# Patient Record
Sex: MALE | Race: BLACK OR AFRICAN AMERICAN | Employment: UNEMPLOYED | URBAN - METROPOLITAN AREA
[De-identification: names, ages, dates, MRNs, and addresses within clinical notes are randomized per-mention and may not be internally consistent; named-entity substitution may affect disease eponyms.]

---

## 2024-02-07 ENCOUNTER — HOSPITAL ENCOUNTER (EMERGENCY)
Age: 44
Discharge: HOME | End: 2024-02-07

## 2024-02-07 ENCOUNTER — APPOINTMENT (OUTPATIENT)
Dept: GENERAL RADIOLOGY | Age: 44
End: 2024-02-07

## 2024-02-07 VITALS
DIASTOLIC BLOOD PRESSURE: 84 MMHG | TEMPERATURE: 99 F | RESPIRATION RATE: 14 BRPM | BODY MASS INDEX: 21.69 KG/M2 | SYSTOLIC BLOOD PRESSURE: 123 MMHG | HEIGHT: 66 IN | WEIGHT: 135 LBS | HEART RATE: 87 BPM | OXYGEN SATURATION: 98 %

## 2024-02-07 DIAGNOSIS — Z72.0 TOBACCO USE: ICD-10-CM

## 2024-02-07 DIAGNOSIS — J20.9 ACUTE BRONCHITIS, UNSPECIFIED ORGANISM: Primary | ICD-10-CM

## 2024-02-07 LAB
FLUAV RNA RESP QL NAA+PROBE: NOT DETECTED
FLUBV RNA RESP QL NAA+PROBE: NOT DETECTED
SARS-COV-2 RNA RESP QL NAA+PROBE: NOT DETECTED

## 2024-02-07 PROCEDURE — 99284 EMERGENCY DEPT VISIT MOD MDM: CPT

## 2024-02-07 PROCEDURE — 71046 X-RAY EXAM CHEST 2 VIEWS: CPT

## 2024-02-07 PROCEDURE — 87636 SARSCOV2 & INF A&B AMP PRB: CPT

## 2024-02-07 RX ORDER — BENZONATATE 100 MG/1
100 CAPSULE ORAL 3 TIMES DAILY PRN
Qty: 30 CAPSULE | Refills: 0 | Status: SHIPPED | OUTPATIENT
Start: 2024-02-07 | End: 2024-02-14

## 2024-02-07 RX ORDER — IBUPROFEN 600 MG/1
600 TABLET ORAL EVERY 6 HOURS PRN
Qty: 20 TABLET | Refills: 0 | Status: SHIPPED | OUTPATIENT
Start: 2024-02-07

## 2024-02-07 RX ORDER — ALBUTEROL SULFATE 90 UG/1
2 AEROSOL, METERED RESPIRATORY (INHALATION) 4 TIMES DAILY PRN
Qty: 54 G | Refills: 1 | Status: SHIPPED | OUTPATIENT
Start: 2024-02-07

## 2024-02-07 ASSESSMENT — PAIN SCALES - GENERAL: PAINLEVEL_OUTOF10: 1

## 2024-02-07 ASSESSMENT — ENCOUNTER SYMPTOMS
SHORTNESS OF BREATH: 0
VOICE CHANGE: 0
VOMITING: 0
BACK PAIN: 0
TROUBLE SWALLOWING: 0
RECTAL PAIN: 0
COUGH: 1
SORE THROAT: 1
STRIDOR: 0
COLOR CHANGE: 0
DIARRHEA: 0
NAUSEA: 0
CONSTIPATION: 0
ABDOMINAL PAIN: 0
WHEEZING: 0

## 2024-02-07 ASSESSMENT — PAIN - FUNCTIONAL ASSESSMENT: PAIN_FUNCTIONAL_ASSESSMENT: 0-10

## 2024-02-07 NOTE — ED PROVIDER NOTES
[facility] EMERGENCY DEPARTMENT  EMERGENCY DEPARTMENT ENCOUNTER        Pt Name: Brian Zamora  MRN: [id number]  Birthdate 1980  Date of evaluation: 2/7/2024  Provider: Radha Hdez PA-C  PCP: No primary care provider on file.  Note Started: 4:29 PM EST 2/7/24      ARJUN. I have evaluated this patient.        CHIEF COMPLAINT       Chief Complaint   Patient presents with    Cough     Cough for months       HISTORY OF PRESENT ILLNESS: 1 or more Elements     History from : Patient    Limitations to history : None    Brian Zamora is a 43 y.o. male who presents to the emergency department with complaints of cough for a little over 1 month.  He smokes cigarettes daily.  He denies productive cough, hemoptysis, palpitations, chest pain, shortness of breath, pain or swelling in extremities, unexplained weight gain or weight loss.  He reports some congestion and sore throat for the past few days.  He denies documented fever or chills.    Nursing Notes were all reviewed and agreed with or any disagreements were addressed in the HPI.    REVIEW OF SYSTEMS :      Review of Systems   Constitutional:  Negative for chills and fever.   HENT:  Positive for congestion and sore throat. Negative for dental problem, drooling, ear discharge, ear pain, tinnitus, trouble swallowing and voice change.    Eyes:  Negative for visual disturbance.   Respiratory:  Positive for cough. Negative for shortness of breath, wheezing and stridor.    Cardiovascular:  Negative for chest pain, palpitations and leg swelling.   Gastrointestinal:  Negative for abdominal pain, constipation, diarrhea, nausea, rectal pain and vomiting.   Genitourinary: Negative.    Musculoskeletal:  Negative for back pain, neck pain and neck stiffness.   Skin:  Negative for color change, pallor, rash and wound.   Neurological: Negative.    Psychiatric/Behavioral:  Negative for confusion.    All other systems reviewed and are

## 2024-02-18 ENCOUNTER — APPOINTMENT (OUTPATIENT)
Dept: GENERAL RADIOLOGY | Age: 44
End: 2024-02-18

## 2024-02-18 ENCOUNTER — HOSPITAL ENCOUNTER (EMERGENCY)
Age: 44
Discharge: HOME | End: 2024-02-18

## 2024-02-18 VITALS
BODY MASS INDEX: 26.87 KG/M2 | WEIGHT: 166.5 LBS | SYSTOLIC BLOOD PRESSURE: 119 MMHG | OXYGEN SATURATION: 99 % | RESPIRATION RATE: 16 BRPM | TEMPERATURE: 98.5 F | DIASTOLIC BLOOD PRESSURE: 77 MMHG | HEART RATE: 70 BPM

## 2024-02-18 DIAGNOSIS — J10.1 INFLUENZA A: ICD-10-CM

## 2024-02-18 DIAGNOSIS — J06.9 ACUTE UPPER RESPIRATORY INFECTION: Primary | ICD-10-CM

## 2024-02-18 LAB
ALBUMIN SERPL-MCNC: 4.8 G/DL (ref 3.4–5)
ALBUMIN/GLOB SERPL: 1.4 {RATIO} (ref 1.1–2.2)
ALP SERPL-CCNC: 67 U/L (ref 40–129)
ALT SERPL-CCNC: 34 U/L (ref 10–40)
ANION GAP SERPL CALCULATED.3IONS-SCNC: 11 MMOL/L (ref 3–16)
AST SERPL-CCNC: 31 U/L (ref 15–37)
BASOPHILS # BLD: 0.1 K/UL (ref 0–0.2)
BASOPHILS NFR BLD: 0.8 %
BILIRUB SERPL-MCNC: 0.4 MG/DL (ref 0–1)
BUN SERPL-MCNC: 8 MG/DL (ref 7–20)
CALCIUM SERPL-MCNC: 9.7 MG/DL (ref 8.3–10.6)
CHLORIDE SERPL-SCNC: 103 MMOL/L (ref 99–110)
CO2 SERPL-SCNC: 26 MMOL/L (ref 21–32)
CREAT SERPL-MCNC: 0.9 MG/DL (ref 0.9–1.3)
DEPRECATED RDW RBC AUTO: 15.7 % (ref 12.4–15.4)
EOSINOPHIL # BLD: 0.5 K/UL (ref 0–0.6)
EOSINOPHIL NFR BLD: 5.7 %
FLUAV RNA RESP QL NAA+PROBE: DETECTED
FLUBV RNA RESP QL NAA+PROBE: NOT DETECTED
GFR SERPLBLD CREATININE-BSD FMLA CKD-EPI: >60 ML/MIN/{1.73_M2}
GLUCOSE SERPL-MCNC: 94 MG/DL (ref 70–99)
HCT VFR BLD AUTO: 50.8 % (ref 40.5–52.5)
HGB BLD-MCNC: 16.3 G/DL (ref 13.5–17.5)
LACTATE BLDV-SCNC: 1.8 MMOL/L (ref 0.4–1.9)
LYMPHOCYTES # BLD: 2.3 K/UL (ref 1–5.1)
LYMPHOCYTES NFR BLD: 27.8 %
MCH RBC QN AUTO: 22.9 PG (ref 26–34)
MCHC RBC AUTO-ENTMCNC: 32.1 G/DL (ref 31–36)
MCV RBC AUTO: 71.5 FL (ref 80–100)
MONOCYTES # BLD: 0.7 K/UL (ref 0–1.3)
MONOCYTES NFR BLD: 8.3 %
NEUTROPHILS # BLD: 4.7 K/UL (ref 1.7–7.7)
NEUTROPHILS NFR BLD: 57.4 %
NT-PROBNP SERPL-MCNC: <36 PG/ML (ref 0–124)
PLATELET # BLD AUTO: 203 K/UL (ref 135–450)
PMV BLD AUTO: 8.2 FL (ref 5–10.5)
POTASSIUM SERPL-SCNC: 4.5 MMOL/L (ref 3.5–5.1)
PROCALCITONIN SERPL IA-MCNC: 0.07 NG/ML (ref 0–0.15)
PROT SERPL-MCNC: 8.2 G/DL (ref 6.4–8.2)
RBC # BLD AUTO: 7.1 M/UL (ref 4.2–5.9)
SARS-COV-2 RNA RESP QL NAA+PROBE: NOT DETECTED
SODIUM SERPL-SCNC: 140 MMOL/L (ref 136–145)
TROPONIN, HIGH SENSITIVITY: 20 NG/L (ref 0–22)
WBC # BLD AUTO: 8.3 K/UL (ref 4–11)

## 2024-02-18 PROCEDURE — 93005 ELECTROCARDIOGRAM TRACING: CPT | Performed by: PHYSICIAN ASSISTANT

## 2024-02-18 PROCEDURE — 94640 AIRWAY INHALATION TREATMENT: CPT

## 2024-02-18 PROCEDURE — 80053 COMPREHEN METABOLIC PANEL: CPT

## 2024-02-18 PROCEDURE — 84145 PROCALCITONIN (PCT): CPT

## 2024-02-18 PROCEDURE — 84484 ASSAY OF TROPONIN QUANT: CPT

## 2024-02-18 PROCEDURE — 83880 ASSAY OF NATRIURETIC PEPTIDE: CPT

## 2024-02-18 PROCEDURE — 87636 SARSCOV2 & INF A&B AMP PRB: CPT

## 2024-02-18 PROCEDURE — 6360000002 HC RX W HCPCS: Performed by: PHYSICIAN ASSISTANT

## 2024-02-18 PROCEDURE — 83605 ASSAY OF LACTIC ACID: CPT

## 2024-02-18 PROCEDURE — 85025 COMPLETE CBC W/AUTO DIFF WBC: CPT

## 2024-02-18 PROCEDURE — 6370000000 HC RX 637 (ALT 250 FOR IP): Performed by: PHYSICIAN ASSISTANT

## 2024-02-18 PROCEDURE — 71046 X-RAY EXAM CHEST 2 VIEWS: CPT

## 2024-02-18 PROCEDURE — 99285 EMERGENCY DEPT VISIT HI MDM: CPT

## 2024-02-18 RX ORDER — PREDNISONE 20 MG/1
60 TABLET ORAL ONCE
Status: COMPLETED | OUTPATIENT
Start: 2024-02-18 | End: 2024-02-18

## 2024-02-18 RX ORDER — ALBUTEROL SULFATE 90 UG/1
AEROSOL, METERED RESPIRATORY (INHALATION)
Qty: 18 G | Refills: 0 | Status: SHIPPED | OUTPATIENT
Start: 2024-02-18

## 2024-02-18 RX ORDER — BENZONATATE 100 MG/1
100 CAPSULE ORAL 3 TIMES DAILY PRN
Qty: 21 CAPSULE | Refills: 0 | Status: SHIPPED | OUTPATIENT
Start: 2024-02-18 | End: 2024-02-25

## 2024-02-18 RX ORDER — PREDNISONE 10 MG/1
40 TABLET ORAL DAILY
Qty: 20 TABLET | Refills: 0 | Status: SHIPPED | OUTPATIENT
Start: 2024-02-18 | End: 2024-02-23

## 2024-02-18 RX ORDER — ALBUTEROL SULFATE 2.5 MG/3ML
5 SOLUTION RESPIRATORY (INHALATION) ONCE
Status: COMPLETED | OUTPATIENT
Start: 2024-02-18 | End: 2024-02-18

## 2024-02-18 RX ADMIN — PREDNISONE 60 MG: 20 TABLET ORAL at 16:50

## 2024-02-18 RX ADMIN — ALBUTEROL SULFATE 5 MG: 2.5 SOLUTION RESPIRATORY (INHALATION) at 17:28

## 2024-02-18 ASSESSMENT — PAIN SCALES - GENERAL: PAINLEVEL_OUTOF10: 0

## 2024-02-19 LAB
EKG ATRIAL RATE: 91 BPM
EKG DIAGNOSIS: NORMAL
EKG P AXIS: 82 DEGREES
EKG P-R INTERVAL: 100 MS
EKG Q-T INTERVAL: 322 MS
EKG QRS DURATION: 78 MS
EKG QTC CALCULATION (BAZETT): 396 MS
EKG R AXIS: 2 DEGREES
EKG T AXIS: 61 DEGREES
EKG VENTRICULAR RATE: 91 BPM

## 2024-02-19 PROCEDURE — 93010 ELECTROCARDIOGRAM REPORT: CPT | Performed by: INTERNAL MEDICINE

## 2024-02-19 NOTE — ED PROVIDER NOTES
[facility] EMERGENCY DEPARTMENT  EMERGENCY DEPARTMENT ENCOUNTER        Pt Name: Brian Zamora  MRN: [id number]  Birthdate 1980  Date of evaluation: 2/18/2024  Provider: Krzysztof Diaz PA-C  PCP: No primary care provider on file.  Note Started: 4:51 PM EST 2/18/24      ARJUN. I have evaluated this patient.        CHIEF COMPLAINT       Chief Complaint   Patient presents with    Cough     Pt came to the hospital due to having a cough, seen on 2/7 and dx with bronchitis.  States that symptoms have not improved and he did not receive medications during his last visit.         HISTORY OF PRESENT ILLNESS: 1 or more Elements     History From: patient  Limitations to history : Language French  was used to obtain history    Brian Zamora is a 43 y.o. male who presents to the emergency department with a chief complaint of a over 1 month history of cough with some discomfort in his chest when he coughs.  He was seen here on 2/7 for similar symptoms and tested negative for COVID and flu and had x-ray imaging that was unremarkable.  States he has been using over-the-counter medication for cough.  Not on any prescription medicine and denies any ongoing medical issues.  Denies any history of asthma, lung disease or heart disease.  He continues to smoke.  Denies nausea, vomiting, fevers, recent sick contacts, recent trip out of the country, urinary or bowel symptoms or any other symptoms.    Nursing Notes were all reviewed and agreed with or any disagreements were addressed in the HPI.    REVIEW OF SYSTEMS :      Review of Systems    Positives and Pertinent negatives as per HPI.     SURGICAL HISTORY   History reviewed. No pertinent surgical history.    CURRENTMEDICATIONS       Previous Medications    ALBUTEROL SULFATE HFA (VENTOLIN HFA) 108 (90 BASE) MCG/ACT INHALER    Inhale 2 puffs into the lungs 4 times daily as needed for Wheezing    IBUPROFEN (IBU) 600 MG TABLET    Take 1 tablet by mouth 
EKG  The Ekg interpreted by me shows  normal sinus rhythm with a rate of 91  Axis is   Normal  QTc is  normal  Short CO, no delta waves   ST Segments: early repol in precordial leads  No prior EKG available for comparison.       Amarjit Stout MD  02/18/24 1903    
done

## 2024-03-09 ENCOUNTER — HOSPITAL ENCOUNTER (EMERGENCY)
Age: 44
Discharge: HOME | End: 2024-03-09
Attending: EMERGENCY MEDICINE

## 2024-03-09 ENCOUNTER — APPOINTMENT (OUTPATIENT)
Dept: GENERAL RADIOLOGY | Age: 44
End: 2024-03-09

## 2024-03-09 VITALS
RESPIRATION RATE: 20 BRPM | TEMPERATURE: 98.3 F | OXYGEN SATURATION: 95 % | WEIGHT: 165 LBS | SYSTOLIC BLOOD PRESSURE: 120 MMHG | HEART RATE: 98 BPM | DIASTOLIC BLOOD PRESSURE: 97 MMHG | BODY MASS INDEX: 25.01 KG/M2 | HEIGHT: 68 IN

## 2024-03-09 DIAGNOSIS — R05.2 SUBACUTE COUGH: Primary | ICD-10-CM

## 2024-03-09 LAB
FLUAV RNA RESP QL NAA+PROBE: NOT DETECTED
FLUBV RNA RESP QL NAA+PROBE: NOT DETECTED
SARS-COV-2 RNA RESP QL NAA+PROBE: NOT DETECTED
SPECIMEN SOURCE: NORMAL

## 2024-03-09 PROCEDURE — 6360000002 HC RX W HCPCS: Performed by: EMERGENCY MEDICINE

## 2024-03-09 PROCEDURE — 87636 SARSCOV2 & INF A&B AMP PRB: CPT

## 2024-03-09 PROCEDURE — 71045 X-RAY EXAM CHEST 1 VIEW: CPT

## 2024-03-09 PROCEDURE — 94640 AIRWAY INHALATION TREATMENT: CPT

## 2024-03-09 PROCEDURE — 99284 EMERGENCY DEPT VISIT MOD MDM: CPT

## 2024-03-09 PROCEDURE — 87798 DETECT AGENT NOS DNA AMP: CPT

## 2024-03-09 RX ORDER — GUAIFENESIN/DEXTROMETHORPHAN 100-10MG/5
10 SYRUP ORAL 4 TIMES DAILY PRN
Qty: 180 ML | Refills: 0 | Status: SHIPPED | OUTPATIENT
Start: 2024-03-09 | End: 2024-03-19

## 2024-03-09 RX ORDER — AZITHROMYCIN 250 MG/1
TABLET, FILM COATED ORAL
Qty: 1 PACKET | Refills: 0 | Status: SHIPPED | OUTPATIENT
Start: 2024-03-09

## 2024-03-09 RX ORDER — BROMPHENIRAMINE MALEATE, PSEUDOEPHEDRINE HYDROCHLORIDE, AND DEXTROMETHORPHAN HYDROBROMIDE 2; 30; 10 MG/5ML; MG/5ML; MG/5ML
5 SYRUP ORAL 3 TIMES DAILY PRN
Qty: 120 ML | Refills: 0 | Status: SHIPPED | OUTPATIENT
Start: 2024-03-09

## 2024-03-09 RX ADMIN — ALBUTEROL SULFATE 2.5 MG: 2.5 SOLUTION RESPIRATORY (INHALATION) at 17:35

## 2024-03-09 ASSESSMENT — PAIN - FUNCTIONAL ASSESSMENT: PAIN_FUNCTIONAL_ASSESSMENT: NONE - DENIES PAIN

## 2024-03-10 NOTE — ED PROVIDER NOTES
DISCONTINUED MEDICATIONS:   Discharge Medication List as of 3/9/2024  5:36 PM               (Please note that portions of this note were completed with a voice recognition program.  Efforts were made to edit the dictations but occasionally words are mis-transcribed.)     Amarjit Stout MD (electronically signed)        Amarjit Stout MD  03/09/24 9075

## 2024-03-11 ENCOUNTER — NURSE TRIAGE (OUTPATIENT)
Dept: OTHER | Facility: CLINIC | Age: 44
End: 2024-03-11
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
## 2024-03-13 LAB
B PARAPERT DNA SPEC QL NAA+PROBE: NOT DETECTED
B PERT DNA SPEC QL NAA+PROBE: NOT DETECTED
SPECIMEN SOURCE: NORMAL

## 2024-03-23 ENCOUNTER — HOSPITAL ENCOUNTER (EMERGENCY)
Age: 44
Discharge: HOME | End: 2024-03-23
Attending: EMERGENCY MEDICINE

## 2024-03-23 ENCOUNTER — APPOINTMENT (OUTPATIENT)
Dept: CT IMAGING | Age: 44
End: 2024-03-23

## 2024-03-23 VITALS
BODY MASS INDEX: 24.86 KG/M2 | DIASTOLIC BLOOD PRESSURE: 88 MMHG | RESPIRATION RATE: 16 BRPM | HEIGHT: 68 IN | TEMPERATURE: 97.9 F | SYSTOLIC BLOOD PRESSURE: 125 MMHG | OXYGEN SATURATION: 98 % | WEIGHT: 164 LBS | HEART RATE: 72 BPM

## 2024-03-23 DIAGNOSIS — F17.210 CIGARETTE SMOKER: ICD-10-CM

## 2024-03-23 DIAGNOSIS — J18.9 PNEUMONIA OF LEFT LOWER LOBE DUE TO INFECTIOUS ORGANISM: Primary | ICD-10-CM

## 2024-03-23 PROCEDURE — 71250 CT THORAX DX C-: CPT

## 2024-03-23 PROCEDURE — 94640 AIRWAY INHALATION TREATMENT: CPT

## 2024-03-23 PROCEDURE — 99284 EMERGENCY DEPT VISIT MOD MDM: CPT

## 2024-03-23 PROCEDURE — 6370000000 HC RX 637 (ALT 250 FOR IP): Performed by: PHYSICIAN ASSISTANT

## 2024-03-23 RX ORDER — PREDNISONE 20 MG/1
60 TABLET ORAL ONCE
Status: COMPLETED | OUTPATIENT
Start: 2024-03-23 | End: 2024-03-23

## 2024-03-23 RX ORDER — AMOXICILLIN AND CLAVULANATE POTASSIUM 875; 125 MG/1; MG/1
1 TABLET, FILM COATED ORAL 2 TIMES DAILY
Qty: 20 TABLET | Refills: 0 | Status: SHIPPED | OUTPATIENT
Start: 2024-03-23 | End: 2024-04-02

## 2024-03-23 RX ORDER — ALBUTEROL SULFATE 90 UG/1
2 AEROSOL, METERED RESPIRATORY (INHALATION) 4 TIMES DAILY PRN
Qty: 54 G | Refills: 1 | Status: SHIPPED | OUTPATIENT
Start: 2024-03-23

## 2024-03-23 RX ORDER — BENZONATATE 100 MG/1
100 CAPSULE ORAL 3 TIMES DAILY PRN
Qty: 30 CAPSULE | Refills: 0 | Status: SHIPPED | OUTPATIENT
Start: 2024-03-23 | End: 2024-03-30

## 2024-03-23 RX ORDER — IPRATROPIUM BROMIDE AND ALBUTEROL SULFATE 2.5; .5 MG/3ML; MG/3ML
1 SOLUTION RESPIRATORY (INHALATION) ONCE
Status: COMPLETED | OUTPATIENT
Start: 2024-03-23 | End: 2024-03-23

## 2024-03-23 RX ADMIN — PREDNISONE 60 MG: 20 TABLET ORAL at 11:48

## 2024-03-23 RX ADMIN — IPRATROPIUM BROMIDE AND ALBUTEROL SULFATE 1 DOSE: .5; 3 SOLUTION RESPIRATORY (INHALATION) at 11:54

## 2024-03-23 ASSESSMENT — ENCOUNTER SYMPTOMS
STRIDOR: 0
TROUBLE SWALLOWING: 0
COUGH: 1
SHORTNESS OF BREATH: 1
ABDOMINAL PAIN: 0
VOICE CHANGE: 0
SORE THROAT: 0
EYES NEGATIVE: 1
WHEEZING: 1

## 2024-03-23 NOTE — ED PROVIDER NOTES
[facility] EMERGENCY DEPARTMENT  EMERGENCY DEPARTMENT ENCOUNTER        Pt Name: Brian Zamora  MRN: [id number]  Birthdate 1980  Date of evaluation: 3/23/2024  Provider: Radha Hdez PA-C  PCP: No primary care provider on file.  Note Started: 11:47 AM EDT 3/23/24       I have seen and evaluated this patient with my supervising physician Jeevan Bowles MD.      CHIEF COMPLAINT       Chief Complaint   Patient presents with    Cough     Cough states it is not getting better       HISTORY OF PRESENT ILLNESS: 1 or more Elements     History from : Patient    Limitations to history : Language Turkish 2427    Brian Zamora is a 43 y.o. male who presents to the emergency department complaining of cough for several months.  He has frequent bronchospasm during history taking.  He continues to smoke cigarettes daily.  He had a recent evaluation including chest x-rays, EKG, blood work and COVID/flu/pertussis testing.  On 2/18/2024, he did test positive for flu A.  On 3/9/2024, patient came back to the emergency department for this persistent cough and was prescribed Bromfed cough syrup and Z-Maximiliano.  He does run out of his albuterol inhaler today.  He denies chest pain, hemoptysis, palpitations, pain or swelling in extremities, unexplained weight gain or weight loss, history of TB, IV drug use or recent travel, surgery or hospitalization.    Nursing Notes were all reviewed and agreed with or any disagreements were addressed in the HPI.    REVIEW OF SYSTEMS :      Review of Systems   Constitutional:  Negative for chills and fever.   HENT:  Positive for congestion. Negative for dental problem, drooling, ear discharge, ear pain, sore throat, tinnitus, trouble swallowing and voice change.    Eyes: Negative.    Respiratory:  Positive for cough, shortness of breath and wheezing. Negative for stridor.    Cardiovascular:  Negative for chest pain, palpitations and leg swelling.   Gastrointestinal:

## 2024-03-23 NOTE — ED NOTES
Patient oriented to room and ED throughput process.  Safety measures with ED bed locked in lowest position and call light in reach.  Patient educated on all orders, including any medications.  Patient educated on chief complaint/symptoms. Patient encouraged to ask questions regarding care, medications or treatment plan.  Patient aware of how to reach staff with questions/concerns.  Resp called for breathing tx

## 2024-03-23 NOTE — ED PROVIDER NOTES
I have personally performed a face to face diagnostic evaluation on this patient. I have fully participated in the care of this patient I personally saw the patient and performed a substantive portion of the visit including all aspects of the medical decision making.  I have reviewed and agree with all pertinent clinical information including history, physical exam, diagnostic tests, and the plan.      HPI: Brian Zamora presented with cough for several months.  Has frequent bronchospasm.  Continues to smoke cigarettes daily.  Has had multiple ER visits with significant workup.  Was positive for flu last month.  Patient came back to the ER and was given a Z-Maximiliano.  Patient still continues to cough.  No chest pain no hemoptysis.  No other associated symptoms.  Chief Complaint   Patient presents with    Cough     Cough states it is not getting better      Review of Systems: See ARJUN note  Vital Signs: /88   Pulse 72   Temp 97.9 °F (36.6 °C) (Oral)   Resp 16   Ht 1.727 m (5' 8\")   Wt 74.4 kg (164 lb)   SpO2 98%   BMI 24.94 kg/m²     Alert 43 y.o. male who does not appear toxic or acutely ill  HENT: Atraumatic, oral mucosa moist  Neck: Grossly normal ROM  Chest/Lungs: respiratory effort normal, bilateral wheezing, no tachypnea, no accessory muscle use  Musculoskeletal: Grossly normal ROM  Skin: No palor or diaphoresis    Medical Decision Making and Plan:  Pertinent Labs & Imaging studies reviewed. (See ARJUN chart for details)  I agree with ARJUN assessment and plan.  Well-appearing 43-year-old male who speaks some English as well as  Prydeinig who presents for persistent cough this is her fourth ER visit in the last 2 months.  He is afebrile not tachycardic saturating well on room air mild wheezing bilaterally patient continues to smoke.  Normotensive no signs systemic sepsis.  His previously had extensive workup.  CT of the chest was obtained today shows possible persistent pneumonia, bronchiectasis,  bronchiolitis.  Will start patient on Augmentin.  Will give patient follow-up with pulmonology.  Encourage patient to not smoke.  Continue beta agonist.  Strict return precautions for any new or worsening symptoms.    I estimate there is LOW risk for PULMONARY EMBOLISM, PULMONARY EDEMA, PNEUMONIA, PNEUMOTHORAX, STATUS ASTHMATICUS, ACUTE RESPIRATORY FAILURE, OR ACUTE CORONARY SYNDROME, thus I consider the discharge disposition reasonable.    The patient is at low risk for mortality based on demographic, history and clinical factors. Given the best available information and clinical assessment, I estimate the risk of hospitalization to be greater than risk of treatment at home. I have explained to the patient that the risk could rapidly change, given precautions for return and instructions. Explained to patient that the risk for mortality is low based on demographic, history and clinical factors.      I discussed with patient the results of evaluation in the ED, diagnosis, care, and prognosis.  The plan is to discharge to home.  Patient is in agreement with plan and questions have been answered.      I also discussed with patient the reasons which may require a return visit and the importance of follow-up care.  The patient is well-appearing, nontoxic, and improved at the time of discharge.  Patient agrees to call to arrange follow-up care as directed.   Patient understands to return immediately for worsening/change in symptoms.       I personally saw the patient and independently provided 0 minutes of nonconcurrent critical care out of the total shared critical care time provided    This includes multiple reevaluations, vital sign monitoring, pulse oximetry monitoring, telemetry monitoring, clinical response to the IV medications, reviewing the nursing notes, consultation time, dictation/documentation time, and interpretation of the labwork. (This time excludes time spent performing procedures).       Jeevan Bowles

## 2024-10-06 ENCOUNTER — HOSPITAL ENCOUNTER (EMERGENCY)
Age: 44
Discharge: HOME | End: 2024-10-06
Attending: STUDENT IN AN ORGANIZED HEALTH CARE EDUCATION/TRAINING PROGRAM

## 2024-10-06 VITALS
HEART RATE: 89 BPM | OXYGEN SATURATION: 95 % | DIASTOLIC BLOOD PRESSURE: 89 MMHG | SYSTOLIC BLOOD PRESSURE: 125 MMHG | RESPIRATION RATE: 20 BRPM | TEMPERATURE: 98.5 F | HEIGHT: 70 IN | BODY MASS INDEX: 24.2 KG/M2 | WEIGHT: 169 LBS

## 2024-10-06 DIAGNOSIS — R06.2 WHEEZING: Primary | ICD-10-CM

## 2024-10-06 PROCEDURE — 6370000000 HC RX 637 (ALT 250 FOR IP): Performed by: STUDENT IN AN ORGANIZED HEALTH CARE EDUCATION/TRAINING PROGRAM

## 2024-10-06 PROCEDURE — 99283 EMERGENCY DEPT VISIT LOW MDM: CPT

## 2024-10-06 PROCEDURE — 94640 AIRWAY INHALATION TREATMENT: CPT

## 2024-10-06 RX ORDER — ALBUTEROL SULFATE 90 UG/1
2 INHALANT RESPIRATORY (INHALATION) 4 TIMES DAILY PRN
Qty: 18 G | Refills: 0 | Status: ON HOLD | OUTPATIENT
Start: 2024-10-06

## 2024-10-06 RX ORDER — PREDNISONE 50 MG/1
50 TABLET ORAL DAILY
Qty: 5 TABLET | Refills: 0 | Status: SHIPPED | OUTPATIENT
Start: 2024-10-06 | End: 2024-10-11

## 2024-10-06 RX ORDER — PREDNISONE 20 MG/1
60 TABLET ORAL ONCE
Status: COMPLETED | OUTPATIENT
Start: 2024-10-06 | End: 2024-10-06

## 2024-10-06 RX ORDER — IPRATROPIUM BROMIDE AND ALBUTEROL SULFATE 2.5; .5 MG/3ML; MG/3ML
2 SOLUTION RESPIRATORY (INHALATION) ONCE
Status: COMPLETED | OUTPATIENT
Start: 2024-10-06 | End: 2024-10-06

## 2024-10-06 RX ADMIN — PREDNISONE 60 MG: 20 TABLET ORAL at 04:42

## 2024-10-06 RX ADMIN — IPRATROPIUM BROMIDE AND ALBUTEROL SULFATE 2 DOSE: .5; 3 SOLUTION RESPIRATORY (INHALATION) at 05:23

## 2024-10-06 ASSESSMENT — PAIN - FUNCTIONAL ASSESSMENT: PAIN_FUNCTIONAL_ASSESSMENT: NONE - DENIES PAIN

## 2024-10-06 ASSESSMENT — LIFESTYLE VARIABLES
HOW OFTEN DO YOU HAVE A DRINK CONTAINING ALCOHOL: MONTHLY OR LESS
HOW MANY STANDARD DRINKS CONTAINING ALCOHOL DO YOU HAVE ON A TYPICAL DAY: 1 OR 2

## 2024-10-06 NOTE — ED PROVIDER NOTES
[facility] EMERGENCY DEPARTMENT  EMERGENCY DEPARTMENT ENCOUNTER      Pt Name: Noah Torres  MRN: [id number]  Birthdate 1980  Date of evaluation: 10/6/2024  Provider: Elina Jeffrey MD    CHIEF COMPLAINT       Chief Complaint   Patient presents with    Medication Refill     Pt presents requesting a medication refill on prednisone pt states he would like more to help with his coughing            HISTORY OF PRESENT ILLNESS   (Location/Symptom, Timing/Onset, Context/Setting, Quality, Duration, Modifying Factors, Severity)  Note limiting factors.   Noah Torres is a 44 y.o. male who presents to the emergency department for wheezing and shortness of breath.  He states that he has been dealing with this for several months and it usually gets better after prednisone.  He also is out of his albuterol inhaler and requests a refill of this.  No chest pain.  Denies fevers but is having a nonproductive cough.    Chart reviewed: He does not have any primary care.  History of chronic cough and wheezing with emergency department encounter starting in May of this year.  Nursing Notes were reviewed.    REVIEW OF SYSTEMS    (2-9 systems for level 4, 10 or more for level 5)     Review of Systems    Except as noted above the remainder of the review of systems was reviewed and negative.       PAST MEDICAL HISTORY   History reviewed. No pertinent past medical history.      SURGICAL HISTORY     History reviewed. No pertinent surgical history.      CURRENT MEDICATIONS       Previous Medications    ALBUTEROL SULFATE HFA (VENTOLIN HFA) 108 (90 BASE) MCG/ACT INHALER    Inhale 2 puffs into the lungs 4 times daily as needed for Wheezing    AZITHROMYCIN (ZITHROMAX) 250 MG TABLET    Per package instructions    BENZONATATE (TESSALON) 200 MG CAPSULE    Take 1 capsule by mouth 3 times daily as needed for Cough    BROMPHENIRAMINE-PSEUDOEPHEDRINE-DM 2-30-10 MG/5ML SYRUP    Take 5 mLs by mouth 3 times daily as needed for Cough

## 2024-10-09 ENCOUNTER — HOSPITAL ENCOUNTER (EMERGENCY)
Age: 44
Discharge: HOME | DRG: 139 | End: 2024-10-09
Attending: STUDENT IN AN ORGANIZED HEALTH CARE EDUCATION/TRAINING PROGRAM
Payer: MEDICAID

## 2024-10-09 ENCOUNTER — APPOINTMENT (OUTPATIENT)
Dept: CT IMAGING | Age: 44
DRG: 139 | End: 2024-10-09
Payer: MEDICAID

## 2024-10-09 VITALS
WEIGHT: 170 LBS | RESPIRATION RATE: 16 BRPM | BODY MASS INDEX: 24.34 KG/M2 | DIASTOLIC BLOOD PRESSURE: 99 MMHG | TEMPERATURE: 98.3 F | OXYGEN SATURATION: 100 % | HEIGHT: 70 IN | HEART RATE: 94 BPM | SYSTOLIC BLOOD PRESSURE: 133 MMHG

## 2024-10-09 DIAGNOSIS — R55 SYNCOPE AND COLLAPSE: ICD-10-CM
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
## 2024-10-09 LAB
AMPHETAMINES UR QL SCN>1000 NG/ML: NORMAL
ANION GAP SERPL CALCULATED.3IONS-SCNC: 11 MMOL/L (ref 3–16)
BARBITURATES UR QL SCN>200 NG/ML: NORMAL
BASE EXCESS BLDV CALC-SCNC: 1.2 MMOL/L (ref -3–3)
BASOPHILS # BLD: 0 K/UL (ref 0–0.2)
BASOPHILS NFR BLD: 0.5 %
BENZODIAZ UR QL SCN>200 NG/ML: NORMAL
BUN SERPL-MCNC: 9 MG/DL (ref 7–20)
CALCIUM SERPL-MCNC: 9.3 MG/DL (ref 8.3–10.6)
CANNABINOIDS UR QL SCN>50 NG/ML: NORMAL
CHLORIDE SERPL-SCNC: 103 MMOL/L (ref 99–110)
CO2 BLDV-SCNC: 59 MMOL/L
CO2 SERPL-SCNC: 24 MMOL/L (ref 21–32)
COCAINE UR QL SCN: NORMAL
COHGB MFR BLDV: 4.2 % (ref 0–1.5)
CREAT SERPL-MCNC: 1 MG/DL (ref 0.9–1.3)
DEPRECATED RDW RBC AUTO: 15.7 % (ref 12.4–15.4)
DRUG SCREEN COMMENT UR-IMP: NORMAL
EOSINOPHIL # BLD: 0 K/UL (ref 0–0.6)
EOSINOPHIL NFR BLD: 0.3 %
ETHANOLAMINE SERPL-MCNC: NORMAL MG/DL (ref 0–0.08)
FENTANYL SCREEN, URINE: NORMAL
FLUAV RNA RESP QL NAA+PROBE: NOT DETECTED
FLUBV RNA RESP QL NAA+PROBE: NOT DETECTED
GFR SERPLBLD CREATININE-BSD FMLA CKD-EPI: >90 ML/MIN/{1.73_M2}
GLUCOSE SERPL-MCNC: 111 MG/DL (ref 70–99)
HCO3 BLDV-SCNC: 25.2 MMOL/L (ref 23–29)
HCT VFR BLD AUTO: 47.3 % (ref 40.5–52.5)
HGB BLD-MCNC: 15.1 G/DL (ref 13.5–17.5)
LACTATE BLDV-SCNC: 1.8 MMOL/L (ref 0.4–2)
LYMPHOCYTES # BLD: 2 K/UL (ref 1–5.1)
LYMPHOCYTES NFR BLD: 21.6 %
MAGNESIUM SERPL-MCNC: 2.18 MG/DL (ref 1.8–2.4)
MCH RBC QN AUTO: 22.4 PG (ref 26–34)
MCHC RBC AUTO-ENTMCNC: 32.1 G/DL (ref 31–36)
MCV RBC AUTO: 70 FL (ref 80–100)
METHADONE UR QL SCN>300 NG/ML: NORMAL
METHGB MFR BLDV: 0.9 %
MONOCYTES # BLD: 0.7 K/UL (ref 0–1.3)
MONOCYTES NFR BLD: 7.3 %
NEUTROPHILS # BLD: 6.5 K/UL (ref 1.7–7.7)
NEUTROPHILS NFR BLD: 70.3 %
O2 CT VFR BLDV CALC: 20 VOL %
O2 THERAPY: ABNORMAL
OPIATES UR QL SCN>300 NG/ML: NORMAL
OXYCODONE UR QL SCN: NORMAL
PCO2 BLDV: 37.3 MMHG (ref 40–50)
PCP UR QL SCN>25 NG/ML: NORMAL
PH BLDV: 7.44 [PH] (ref 7.35–7.45)
PH UR STRIP: 6 [PH]
PLATELET # BLD AUTO: 185 K/UL (ref 135–450)
PMV BLD AUTO: 8.9 FL (ref 5–10.5)
PO2 BLDV: 91.9 MMHG (ref 25–40)
POTASSIUM SERPL-SCNC: 4.2 MMOL/L (ref 3.5–5.1)
RBC # BLD AUTO: 6.75 M/UL (ref 4.2–5.9)
SAO2 % BLDV: 98 %
SARS-COV-2 RNA RESP QL NAA+PROBE: NOT DETECTED
SODIUM SERPL-SCNC: 138 MMOL/L (ref 136–145)
TROPONIN, HIGH SENSITIVITY: 8 NG/L (ref 0–22)
TROPONIN, HIGH SENSITIVITY: 9 NG/L (ref 0–22)
WBC # BLD AUTO: 9.3 K/UL (ref 4–11)

## 2024-10-09 PROCEDURE — 99284 EMERGENCY DEPT VISIT MOD MDM: CPT

## 2024-10-09 PROCEDURE — 82077 ASSAY SPEC XCP UR&BREATH IA: CPT

## 2024-10-09 PROCEDURE — 80048 BASIC METABOLIC PNL TOTAL CA: CPT

## 2024-10-09 PROCEDURE — 83735 ASSAY OF MAGNESIUM: CPT

## 2024-10-09 PROCEDURE — 85025 COMPLETE CBC W/AUTO DIFF WBC: CPT

## 2024-10-09 PROCEDURE — 87636 SARSCOV2 & INF A&B AMP PRB: CPT

## 2024-10-09 PROCEDURE — 84484 ASSAY OF TROPONIN QUANT: CPT

## 2024-10-09 PROCEDURE — 83605 ASSAY OF LACTIC ACID: CPT

## 2024-10-09 PROCEDURE — 70450 CT HEAD/BRAIN W/O DYE: CPT

## 2024-10-09 PROCEDURE — 80307 DRUG TEST PRSMV CHEM ANLYZR: CPT

## 2024-10-09 PROCEDURE — 82803 BLOOD GASES ANY COMBINATION: CPT

## 2024-10-09 PROCEDURE — 72125 CT NECK SPINE W/O DYE: CPT

## 2024-10-09 PROCEDURE — 93005 ELECTROCARDIOGRAM TRACING: CPT | Performed by: STUDENT IN AN ORGANIZED HEALTH CARE EDUCATION/TRAINING PROGRAM

## 2024-10-09 ASSESSMENT — PAIN - FUNCTIONAL ASSESSMENT: PAIN_FUNCTIONAL_ASSESSMENT: NONE - DENIES PAIN

## 2024-10-09 ASSESSMENT — LIFESTYLE VARIABLES
HOW MANY STANDARD DRINKS CONTAINING ALCOHOL DO YOU HAVE ON A TYPICAL DAY: PATIENT DOES NOT DRINK
HOW OFTEN DO YOU HAVE A DRINK CONTAINING ALCOHOL: NEVER
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
## 2024-10-12 ENCOUNTER — APPOINTMENT (OUTPATIENT)
Dept: CT IMAGING | Age: 44
DRG: 139 | End: 2024-10-12
Payer: MEDICAID

## 2024-10-12 ENCOUNTER — HOSPITAL ENCOUNTER (INPATIENT)
Age: 44
LOS: 2 days | Discharge: HOME | DRG: 139 | End: 2024-10-14
Attending: INTERNAL MEDICINE | Admitting: STUDENT IN AN ORGANIZED HEALTH CARE EDUCATION/TRAINING PROGRAM
Payer: MEDICAID

## 2024-10-12 DIAGNOSIS — R79.89 ELEVATED TROPONIN: ICD-10-CM

## 2024-10-12 DIAGNOSIS — J18.9 MULTIFOCAL PNEUMONIA: Primary | ICD-10-CM

## 2024-10-12 LAB
ANION GAP SERPL CALCULATED.3IONS-SCNC: 11 MMOL/L (ref 3–16)
BASOPHILS # BLD: 0 K/UL (ref 0–0.2)
BASOPHILS NFR BLD: 0.6 %
BUN SERPL-MCNC: 9 MG/DL (ref 7–20)
CALCIUM SERPL-MCNC: 8.9 MG/DL (ref 8.3–10.6)
CHLORIDE SERPL-SCNC: 105 MMOL/L (ref 99–110)
CO2 SERPL-SCNC: 25 MMOL/L (ref 21–32)
CREAT SERPL-MCNC: 1.1 MG/DL (ref 0.9–1.3)
DEPRECATED RDW RBC AUTO: 16.1 % (ref 12.4–15.4)
EKG ATRIAL RATE: 72 BPM
EKG DIAGNOSIS: NORMAL
EKG P AXIS: 72 DEGREES
EKG P-R INTERVAL: 110 MS
EKG Q-T INTERVAL: 364 MS
EKG QRS DURATION: 80 MS
EKG QTC CALCULATION (BAZETT): 398 MS
EKG R AXIS: 0 DEGREES
EKG T AXIS: 17 DEGREES
EKG VENTRICULAR RATE: 72 BPM
EOSINOPHIL # BLD: 0.3 K/UL (ref 0–0.6)
EOSINOPHIL NFR BLD: 3.3 %
FLUAV RNA RESP QL NAA+PROBE: NOT DETECTED
FLUBV RNA RESP QL NAA+PROBE: NOT DETECTED
GFR SERPLBLD CREATININE-BSD FMLA CKD-EPI: 84 ML/MIN/{1.73_M2}
GLUCOSE SERPL-MCNC: 92 MG/DL (ref 70–99)
HCT VFR BLD AUTO: 48.5 % (ref 40.5–52.5)
HGB BLD-MCNC: 15.1 G/DL (ref 13.5–17.5)
LYMPHOCYTES # BLD: 3.3 K/UL (ref 1–5.1)
LYMPHOCYTES NFR BLD: 36.8 %
MCH RBC QN AUTO: 22.3 PG (ref 26–34)
MCHC RBC AUTO-ENTMCNC: 31.2 G/DL (ref 31–36)
MCV RBC AUTO: 71.4 FL (ref 80–100)
MONOCYTES # BLD: 0.7 K/UL (ref 0–1.3)
MONOCYTES NFR BLD: 7.9 %
NEUTROPHILS # BLD: 4.6 K/UL (ref 1.7–7.7)
NEUTROPHILS NFR BLD: 51.4 %
PLATELET # BLD AUTO: 200 K/UL (ref 135–450)
PMV BLD AUTO: 8.5 FL (ref 5–10.5)
POTASSIUM SERPL-SCNC: 3.8 MMOL/L (ref 3.5–5.1)
PROCALCITONIN SERPL IA-MCNC: 0.04 NG/ML (ref 0–0.15)
RBC # BLD AUTO: 6.79 M/UL (ref 4.2–5.9)
SARS-COV-2 RNA RESP QL NAA+PROBE: NOT DETECTED
SODIUM SERPL-SCNC: 141 MMOL/L (ref 136–145)
TROPONIN, HIGH SENSITIVITY: 28 NG/L (ref 0–22)
TROPONIN, HIGH SENSITIVITY: 29 NG/L (ref 0–22)
WBC # BLD AUTO: 8.9 K/UL (ref 4–11)

## 2024-10-12 PROCEDURE — 96366 THER/PROPH/DIAG IV INF ADDON: CPT

## 2024-10-12 PROCEDURE — 87636 SARSCOV2 & INF A&B AMP PRB: CPT

## 2024-10-12 PROCEDURE — 6370000000 HC RX 637 (ALT 250 FOR IP): Performed by: NURSE PRACTITIONER

## 2024-10-12 PROCEDURE — 93005 ELECTROCARDIOGRAM TRACING: CPT | Performed by: PHYSICIAN ASSISTANT

## 2024-10-12 PROCEDURE — 2580000003 HC RX 258: Performed by: NURSE PRACTITIONER

## 2024-10-12 PROCEDURE — 94761 N-INVAS EAR/PLS OXIMETRY MLT: CPT

## 2024-10-12 PROCEDURE — 96367 TX/PROPH/DG ADDL SEQ IV INF: CPT

## 2024-10-12 PROCEDURE — 2580000003 HC RX 258: Performed by: PHYSICIAN ASSISTANT

## 2024-10-12 PROCEDURE — 6360000002 HC RX W HCPCS: Performed by: INTERNAL MEDICINE

## 2024-10-12 PROCEDURE — 1200000000 HC SEMI PRIVATE

## 2024-10-12 PROCEDURE — 96365 THER/PROPH/DIAG IV INF INIT: CPT

## 2024-10-12 PROCEDURE — 84145 PROCALCITONIN (PCT): CPT

## 2024-10-12 PROCEDURE — 80048 BASIC METABOLIC PNL TOTAL CA: CPT

## 2024-10-12 PROCEDURE — 6370000000 HC RX 637 (ALT 250 FOR IP): Performed by: PHYSICIAN ASSISTANT

## 2024-10-12 PROCEDURE — 6360000002 HC RX W HCPCS: Performed by: PHYSICIAN ASSISTANT

## 2024-10-12 PROCEDURE — 85025 COMPLETE CBC W/AUTO DIFF WBC: CPT

## 2024-10-12 PROCEDURE — 96375 TX/PRO/DX INJ NEW DRUG ADDON: CPT

## 2024-10-12 PROCEDURE — 71260 CT THORAX DX C+: CPT

## 2024-10-12 PROCEDURE — 84484 ASSAY OF TROPONIN QUANT: CPT

## 2024-10-12 PROCEDURE — 6360000004 HC RX CONTRAST MEDICATION: Performed by: PHYSICIAN ASSISTANT

## 2024-10-12 PROCEDURE — 93010 ELECTROCARDIOGRAM REPORT: CPT | Performed by: INTERNAL MEDICINE

## 2024-10-12 PROCEDURE — 2580000003 HC RX 258: Performed by: INTERNAL MEDICINE

## 2024-10-12 PROCEDURE — 99285 EMERGENCY DEPT VISIT HI MDM: CPT

## 2024-10-12 PROCEDURE — 94640 AIRWAY INHALATION TREATMENT: CPT

## 2024-10-12 RX ORDER — ONDANSETRON 4 MG/1
4 TABLET, ORALLY DISINTEGRATING ORAL EVERY 8 HOURS PRN
Status: DISCONTINUED | OUTPATIENT
Start: 2024-10-12 | End: 2024-10-14 | Stop reason: HOSPADM

## 2024-10-12 RX ORDER — SODIUM CHLORIDE 9 MG/ML
INJECTION, SOLUTION INTRAVENOUS PRN
Status: DISCONTINUED | OUTPATIENT
Start: 2024-10-12 | End: 2024-10-14 | Stop reason: HOSPADM

## 2024-10-12 RX ORDER — GUAIFENESIN 600 MG/1
600 TABLET, EXTENDED RELEASE ORAL 2 TIMES DAILY
Status: DISCONTINUED | OUTPATIENT
Start: 2024-10-12 | End: 2024-10-13

## 2024-10-12 RX ORDER — GUAIFENESIN/DEXTROMETHORPHAN 100-10MG/5
5 SYRUP ORAL EVERY 4 HOURS PRN
Status: DISCONTINUED | OUTPATIENT
Start: 2024-10-12 | End: 2024-10-14 | Stop reason: HOSPADM

## 2024-10-12 RX ORDER — ONDANSETRON 2 MG/ML
4 INJECTION INTRAMUSCULAR; INTRAVENOUS EVERY 6 HOURS PRN
Status: DISCONTINUED | OUTPATIENT
Start: 2024-10-12 | End: 2024-10-12

## 2024-10-12 RX ORDER — ACETAMINOPHEN 325 MG/1
650 TABLET ORAL EVERY 6 HOURS PRN
Status: DISCONTINUED | OUTPATIENT
Start: 2024-10-12 | End: 2024-10-14 | Stop reason: HOSPADM

## 2024-10-12 RX ORDER — ENOXAPARIN SODIUM 100 MG/ML
40 INJECTION SUBCUTANEOUS DAILY
Status: DISCONTINUED | OUTPATIENT
Start: 2024-10-13 | End: 2024-10-12

## 2024-10-12 RX ORDER — SODIUM CHLORIDE 0.9 % (FLUSH) 0.9 %
5-40 SYRINGE (ML) INJECTION EVERY 12 HOURS SCHEDULED
Status: DISCONTINUED | OUTPATIENT
Start: 2024-10-12 | End: 2024-10-14 | Stop reason: HOSPADM

## 2024-10-12 RX ORDER — IOPAMIDOL 755 MG/ML
75 INJECTION, SOLUTION INTRAVASCULAR
Status: COMPLETED | OUTPATIENT
Start: 2024-10-12 | End: 2024-10-12

## 2024-10-12 RX ORDER — POLYETHYLENE GLYCOL 3350 17 G/17G
17 POWDER, FOR SOLUTION ORAL DAILY PRN
Status: DISCONTINUED | OUTPATIENT
Start: 2024-10-12 | End: 2024-10-14 | Stop reason: HOSPADM

## 2024-10-12 RX ORDER — SODIUM CHLORIDE 9 MG/ML
INJECTION, SOLUTION INTRAVENOUS PRN
Status: DISCONTINUED | OUTPATIENT
Start: 2024-10-12 | End: 2024-10-12

## 2024-10-12 RX ORDER — PREDNISONE 20 MG/1
40 TABLET ORAL DAILY
Status: DISCONTINUED | OUTPATIENT
Start: 2024-10-14 | End: 2024-10-13

## 2024-10-12 RX ORDER — SODIUM CHLORIDE 0.9 % (FLUSH) 0.9 %
10 SYRINGE (ML) INJECTION EVERY 12 HOURS SCHEDULED
Status: DISCONTINUED | OUTPATIENT
Start: 2024-10-12 | End: 2024-10-14 | Stop reason: HOSPADM

## 2024-10-12 RX ORDER — ACETAMINOPHEN 650 MG/1
650 SUPPOSITORY RECTAL EVERY 6 HOURS PRN
Status: DISCONTINUED | OUTPATIENT
Start: 2024-10-12 | End: 2024-10-14 | Stop reason: HOSPADM

## 2024-10-12 RX ORDER — ACETAMINOPHEN 325 MG/1
650 TABLET ORAL EVERY 6 HOURS PRN
Status: DISCONTINUED | OUTPATIENT
Start: 2024-10-12 | End: 2024-10-12

## 2024-10-12 RX ORDER — NICOTINE 21 MG/24HR
1 PATCH, TRANSDERMAL 24 HOURS TRANSDERMAL DAILY
Status: DISCONTINUED | OUTPATIENT
Start: 2024-10-13 | End: 2024-10-14 | Stop reason: HOSPADM

## 2024-10-12 RX ORDER — AZITHROMYCIN 500 MG/1
500 INJECTION, POWDER, LYOPHILIZED, FOR SOLUTION INTRAVENOUS ONCE
Status: DISCONTINUED | OUTPATIENT
Start: 2024-10-12 | End: 2024-10-12

## 2024-10-12 RX ORDER — ONDANSETRON 2 MG/ML
4 INJECTION INTRAMUSCULAR; INTRAVENOUS EVERY 6 HOURS PRN
Status: DISCONTINUED | OUTPATIENT
Start: 2024-10-12 | End: 2024-10-14 | Stop reason: HOSPADM

## 2024-10-12 RX ORDER — MAGNESIUM SULFATE IN WATER 40 MG/ML
2000 INJECTION, SOLUTION INTRAVENOUS ONCE
Status: COMPLETED | OUTPATIENT
Start: 2024-10-12 | End: 2024-10-12

## 2024-10-12 RX ORDER — ALBUTEROL SULFATE 0.83 MG/ML
2.5 SOLUTION RESPIRATORY (INHALATION) ONCE
Status: COMPLETED | OUTPATIENT
Start: 2024-10-12 | End: 2024-10-12

## 2024-10-12 RX ORDER — SODIUM CHLORIDE 0.9 % (FLUSH) 0.9 %
10 SYRINGE (ML) INJECTION PRN
Status: DISCONTINUED | OUTPATIENT
Start: 2024-10-12 | End: 2024-10-14 | Stop reason: HOSPADM

## 2024-10-12 RX ORDER — ACETAMINOPHEN 650 MG/1
650 SUPPOSITORY RECTAL EVERY 6 HOURS PRN
Status: DISCONTINUED | OUTPATIENT
Start: 2024-10-12 | End: 2024-10-12

## 2024-10-12 RX ORDER — ENOXAPARIN SODIUM 100 MG/ML
40 INJECTION SUBCUTANEOUS DAILY
Status: DISCONTINUED | OUTPATIENT
Start: 2024-10-13 | End: 2024-10-14 | Stop reason: HOSPADM

## 2024-10-12 RX ORDER — SODIUM CHLORIDE 0.9 % (FLUSH) 0.9 %
5-40 SYRINGE (ML) INJECTION PRN
Status: DISCONTINUED | OUTPATIENT
Start: 2024-10-12 | End: 2024-10-14 | Stop reason: HOSPADM

## 2024-10-12 RX ORDER — ONDANSETRON 4 MG/1
4 TABLET, ORALLY DISINTEGRATING ORAL EVERY 8 HOURS PRN
Status: DISCONTINUED | OUTPATIENT
Start: 2024-10-12 | End: 2024-10-12

## 2024-10-12 RX ORDER — ALBUTEROL SULFATE 90 UG/1
2 INHALANT RESPIRATORY (INHALATION) 4 TIMES DAILY PRN
Status: DISCONTINUED | OUTPATIENT
Start: 2024-10-12 | End: 2024-10-13

## 2024-10-12 RX ORDER — IPRATROPIUM BROMIDE AND ALBUTEROL SULFATE 2.5; .5 MG/3ML; MG/3ML
1 SOLUTION RESPIRATORY (INHALATION) ONCE
Status: COMPLETED | OUTPATIENT
Start: 2024-10-12 | End: 2024-10-12

## 2024-10-12 RX ADMIN — GUAIFENESIN 600 MG: 600 TABLET, EXTENDED RELEASE ORAL at 22:09

## 2024-10-12 RX ADMIN — IPRATROPIUM BROMIDE AND ALBUTEROL SULFATE 1 DOSE: .5; 3 SOLUTION RESPIRATORY (INHALATION) at 17:26

## 2024-10-12 RX ADMIN — IOPAMIDOL 75 ML: 755 INJECTION, SOLUTION INTRAVENOUS at 19:40

## 2024-10-12 RX ADMIN — SODIUM CHLORIDE, PRESERVATIVE FREE 0.5 MG: 5 INJECTION INTRAVENOUS at 18:03

## 2024-10-12 RX ADMIN — WATER 1000 MG: 1 INJECTION INTRAMUSCULAR; INTRAVENOUS; SUBCUTANEOUS at 20:36

## 2024-10-12 RX ADMIN — AZITHROMYCIN MONOHYDRATE 500 MG: 500 INJECTION, POWDER, LYOPHILIZED, FOR SOLUTION INTRAVENOUS at 20:45

## 2024-10-12 RX ADMIN — ALBUTEROL SULFATE 2.5 MG: 2.5 SOLUTION RESPIRATORY (INHALATION) at 17:30

## 2024-10-12 RX ADMIN — METHYLPREDNISOLONE SODIUM SUCCINATE 125 MG: 125 INJECTION INTRAMUSCULAR; INTRAVENOUS at 17:12

## 2024-10-12 RX ADMIN — MAGNESIUM SULFATE HEPTAHYDRATE 2000 MG: 40 INJECTION, SOLUTION INTRAVENOUS at 17:15

## 2024-10-12 RX ADMIN — SODIUM CHLORIDE, PRESERVATIVE FREE 10 ML: 5 INJECTION INTRAVENOUS at 22:09

## 2024-10-12 NOTE — ED PROVIDER NOTES
[facility]  EMERGENCY DEPARTMENT ENCOUNTER        Pt Name: Brian Zamora  MRN: [id number]  Birthdate 1980  Date of evaluation: 10/12/2024  Provider: JESSICA Patrick  PCP: No primary care provider on file.  Note Started: 4:37 PM EDT 10/12/24       I have seen and evaluated this patient with my supervising physician Bridger Aceves DO.      CHIEF COMPLAINT       Chief Complaint   Patient presents with    Cough     Pt states that he has been coughing a lot since 3-4 months. Pt states that he was given medication but if he doesn't take or run out the cough comes back. Pt states unsure if he has asthma, stopped smoking for 2 months so far.        HISTORY OF PRESENT ILLNESS: 1 or more Elements     History From: Patient  Limitations to history : Language Amharic- required    Brian Zamora is a 44 y.o. male who presents to the emergency department with complaint of acute on chronic cough.  He has been seen here multiple times for this same complaint.  During his ED visits, he has been suspected of having undiagnosed asthma or COPD, but has not formally been diagnosed.  He has not followed up with primary care for PFTs.  Patient does have history of smoking, although reports quitting about 2 months ago.  He reports improvement whenever he takes steroids, with worsening as soon as the course ends.  Patient was placed on a 5-day course of steroids 6 days ago, and did have improvement, but now has worsening.  He is reporting shortness of breath and wheezing.  He is also reporting some central chest pain.  Patient has not been taking anything over-the-counter for the cough, and denies any other medications over the last week aside from the prednisone and albuterol inhaler.  He reports cough is productive of mucus.  He denies fever or other flulike symptoms.  No other acute complaints.    Nursing Notes were all reviewed and agreed with or any disagreements were addressed in the HPI.    REVIEW OF

## 2024-10-12 NOTE — ED NOTES
Pt is hysterical in bed, refusing oxygen, attempting to sooth pt with help of interpretor, pt is having a hard time and is unable to calm down. 15 mins spent in room calming pt. Pt finally allowed oxygen after multiple explanations on the benefits. Resp called to bedside

## 2024-10-12 NOTE — ED PROVIDER NOTES
In addition to the advanced practice provider, I personally saw Brian Zamora and performed a substantive portion of the visit including all aspects of the medical decision making.    Medical Decision Making  44-year-old male comes into the ER for shortness of breath and cough along with substernal chest pain.  Patient speaks Moroccan and  was used.  Troponin was initially elevated at 29 came down to 28.  Patient's labs are reassuring.  EKG did not show any acute findings.  The troponin elevation is new compared to previous troponin levels.  CT of the chest does show scattered groundglass opacities throughout the lungs.  Patient did require some Ativan due to anxiety over the shortness of breath and chest pain.  EKG does show sinus tachycardia without evidence of STEMI.  I believe the patient needs to be admitted due to the elevation in troponin with the shortness of breath and chest pain.  I did add a procalcitonin and a COVID and flu was added.  Patient was given Rocephin along with azithromycin.  A call was placed to hospitalist who excepted the admission.  Condition is critical.    Critical care time: 38 minutes.  This excludes separately billable procedures        EKG  The Ekg interpreted by me in the absence of a cardiologist shows.  sinus tachycardia, iefa=450  with a rate of 109  Axis is   Normal  QTc is  normal  Intervals and Durations are unremarkable.      No specific ST-T wave changes appreciated.  No evidence of acute ischemia.   No significant change from prior EKG dated 10/9/2024    SEP-1  Is this patient to be included in the SEP-1 Core Measure due to severe sepsis or septic shock?   No    Screenings     [place] Coma Scale  Eye Opening: Spontaneous  Best Verbal Response: Oriented  Best Motor Response: Obeys commands  [place] Coma Scale Score: 15             Patient Referrals:  No follow-up provider specified.    Discharge Medications:  New Prescriptions    No medications on file       FINAL  IMPRESSION  1. Multifocal pneumonia    2. Elevated troponin        Blood pressure 123/73, pulse 99, temperature 98.2 °F (36.8 °C), temperature source Oral, resp. rate 15, SpO2 95%.     I appreciate the ARJUN's collaboration on this case.    For further details of Brian Zamora's emergency department encounter, please see documentation by advanced practice provider, Sherri Alfred.        Bridger Aceves,   10/12/24 2055

## 2024-10-12 NOTE — ED NOTES
Pt rolling around in the bed, gripping the side rails pt appears very tense, states the pain in his head is not \"better\" states \"no change, no change\"

## 2024-10-13 LAB
ANION GAP SERPL CALCULATED.3IONS-SCNC: 13 MMOL/L (ref 3–16)
BASOPHILS # BLD: 0 K/UL (ref 0–0.2)
BASOPHILS NFR BLD: 0.3 %
BUN SERPL-MCNC: 10 MG/DL (ref 7–20)
CALCIUM SERPL-MCNC: 8.8 MG/DL (ref 8.3–10.6)
CHLORIDE SERPL-SCNC: 101 MMOL/L (ref 99–110)
CHOLEST SERPL-MCNC: 193 MG/DL (ref 0–199)
CO2 SERPL-SCNC: 21 MMOL/L (ref 21–32)
CREAT SERPL-MCNC: 0.9 MG/DL (ref 0.9–1.3)
DEPRECATED RDW RBC AUTO: 16.1 % (ref 12.4–15.4)
EKG ATRIAL RATE: 109 BPM
EKG DIAGNOSIS: NORMAL
EKG P AXIS: 80 DEGREES
EKG P-R INTERVAL: 116 MS
EKG Q-T INTERVAL: 304 MS
EKG QRS DURATION: 76 MS
EKG QTC CALCULATION (BAZETT): 409 MS
EKG R AXIS: 24 DEGREES
EKG T AXIS: 63 DEGREES
EKG VENTRICULAR RATE: 109 BPM
EOSINOPHIL # BLD: 0 K/UL (ref 0–0.6)
EOSINOPHIL NFR BLD: 0 %
GFR SERPLBLD CREATININE-BSD FMLA CKD-EPI: >90 ML/MIN/{1.73_M2}
GLUCOSE SERPL-MCNC: 151 MG/DL (ref 70–99)
HCT VFR BLD AUTO: 46.3 % (ref 40.5–52.5)
HDLC SERPL-MCNC: 62 MG/DL (ref 40–60)
HGB BLD-MCNC: 14.5 G/DL (ref 13.5–17.5)
LDL CHOLESTEROL: 122 MG/DL
LYMPHOCYTES # BLD: 1.2 K/UL (ref 1–5.1)
LYMPHOCYTES NFR BLD: 11.2 %
MCH RBC QN AUTO: 22.2 PG (ref 26–34)
MCHC RBC AUTO-ENTMCNC: 31.4 G/DL (ref 31–36)
MCV RBC AUTO: 70.7 FL (ref 80–100)
MONOCYTES # BLD: 0.3 K/UL (ref 0–1.3)
MONOCYTES NFR BLD: 2.4 %
NEUTROPHILS # BLD: 9.1 K/UL (ref 1.7–7.7)
NEUTROPHILS NFR BLD: 86.1 %
NT-PROBNP SERPL-MCNC: 79 PG/ML (ref 0–124)
PLATELET # BLD AUTO: 203 K/UL (ref 135–450)
PMV BLD AUTO: 8.8 FL (ref 5–10.5)
POTASSIUM SERPL-SCNC: 4.2 MMOL/L (ref 3.5–5.1)
RBC # BLD AUTO: 6.54 M/UL (ref 4.2–5.9)
REPORT: NORMAL
RESP PATH DNA+RNA PNL NPH NAA+NON-PROBE: NORMAL
SODIUM SERPL-SCNC: 135 MMOL/L (ref 136–145)
T4 FREE SERPL-MCNC: 1.3 NG/DL (ref 0.9–1.8)
TRIGL SERPL-MCNC: 47 MG/DL (ref 0–150)
TROPONIN, HIGH SENSITIVITY: 18 NG/L (ref 0–22)
TROPONIN, HIGH SENSITIVITY: 26 NG/L (ref 0–22)
TSH SERPL DL<=0.005 MIU/L-ACNC: 0.14 UIU/ML (ref 0.27–4.2)
VLDLC SERPL CALC-MCNC: 9 MG/DL
WBC # BLD AUTO: 10.5 K/UL (ref 4–11)

## 2024-10-13 PROCEDURE — 80061 LIPID PANEL: CPT

## 2024-10-13 PROCEDURE — 84443 ASSAY THYROID STIM HORMONE: CPT

## 2024-10-13 PROCEDURE — 6370000000 HC RX 637 (ALT 250 FOR IP): Performed by: INTERNAL MEDICINE

## 2024-10-13 PROCEDURE — 83880 ASSAY OF NATRIURETIC PEPTIDE: CPT

## 2024-10-13 PROCEDURE — 84484 ASSAY OF TROPONIN QUANT: CPT

## 2024-10-13 PROCEDURE — 83036 HEMOGLOBIN GLYCOSYLATED A1C: CPT

## 2024-10-13 PROCEDURE — 85025 COMPLETE CBC W/AUTO DIFF WBC: CPT

## 2024-10-13 PROCEDURE — 84439 ASSAY OF FREE THYROXINE: CPT

## 2024-10-13 PROCEDURE — 6360000002 HC RX W HCPCS: Performed by: NURSE PRACTITIONER

## 2024-10-13 PROCEDURE — 94640 AIRWAY INHALATION TREATMENT: CPT

## 2024-10-13 PROCEDURE — 6370000000 HC RX 637 (ALT 250 FOR IP): Performed by: NURSE PRACTITIONER

## 2024-10-13 PROCEDURE — 36415 COLL VENOUS BLD VENIPUNCTURE: CPT

## 2024-10-13 PROCEDURE — 80048 BASIC METABOLIC PNL TOTAL CA: CPT

## 2024-10-13 PROCEDURE — 2580000003 HC RX 258: Performed by: NURSE PRACTITIONER

## 2024-10-13 PROCEDURE — 0202U NFCT DS 22 TRGT SARS-COV-2: CPT

## 2024-10-13 PROCEDURE — 6370000000 HC RX 637 (ALT 250 FOR IP): Performed by: STUDENT IN AN ORGANIZED HEALTH CARE EDUCATION/TRAINING PROGRAM

## 2024-10-13 PROCEDURE — 87449 NOS EACH ORGANISM AG IA: CPT

## 2024-10-13 PROCEDURE — 1200000000 HC SEMI PRIVATE

## 2024-10-13 PROCEDURE — 93010 ELECTROCARDIOGRAM REPORT: CPT | Performed by: INTERNAL MEDICINE

## 2024-10-13 PROCEDURE — 94761 N-INVAS EAR/PLS OXIMETRY MLT: CPT

## 2024-10-13 RX ORDER — ALBUTEROL SULFATE 90 UG/1
2 INHALANT RESPIRATORY (INHALATION) EVERY 4 HOURS PRN
Status: DISCONTINUED | OUTPATIENT
Start: 2024-10-13 | End: 2024-10-14 | Stop reason: HOSPADM

## 2024-10-13 RX ORDER — CHLORHEXIDINE GLUCONATE ORAL RINSE 1.2 MG/ML
15 SOLUTION DENTAL 2 TIMES DAILY
Status: DISCONTINUED | OUTPATIENT
Start: 2024-10-13 | End: 2024-10-14 | Stop reason: HOSPADM

## 2024-10-13 RX ORDER — GUAIFENESIN 600 MG/1
600 TABLET, EXTENDED RELEASE ORAL 2 TIMES DAILY
Status: DISCONTINUED | OUTPATIENT
Start: 2024-10-13 | End: 2024-10-14 | Stop reason: HOSPADM

## 2024-10-13 RX ORDER — IPRATROPIUM BROMIDE AND ALBUTEROL SULFATE 2.5; .5 MG/3ML; MG/3ML
1 SOLUTION RESPIRATORY (INHALATION)
Status: DISCONTINUED | OUTPATIENT
Start: 2024-10-13 | End: 2024-10-14 | Stop reason: HOSPADM

## 2024-10-13 RX ORDER — ALBUTEROL SULFATE 90 UG/1
2 INHALANT RESPIRATORY (INHALATION)
Status: DISCONTINUED | OUTPATIENT
Start: 2024-10-13 | End: 2024-10-13

## 2024-10-13 RX ORDER — ASCORBIC ACID 500 MG
500 TABLET ORAL DAILY
Status: DISCONTINUED | OUTPATIENT
Start: 2024-10-13 | End: 2024-10-14 | Stop reason: HOSPADM

## 2024-10-13 RX ORDER — PREDNISONE 20 MG/1
40 TABLET ORAL DAILY
Status: DISCONTINUED | OUTPATIENT
Start: 2024-10-14 | End: 2024-10-14 | Stop reason: HOSPADM

## 2024-10-13 RX ADMIN — Medication 10 ML: at 21:40

## 2024-10-13 RX ADMIN — GUAIFENESIN 600 MG: 600 TABLET, EXTENDED RELEASE ORAL at 21:40

## 2024-10-13 RX ADMIN — ENOXAPARIN SODIUM 40 MG: 100 INJECTION SUBCUTANEOUS at 08:59

## 2024-10-13 RX ADMIN — WATER 40 MG: 1 INJECTION INTRAMUSCULAR; INTRAVENOUS; SUBCUTANEOUS at 00:59

## 2024-10-13 RX ADMIN — OXYCODONE HYDROCHLORIDE AND ACETAMINOPHEN 500 MG: 500 TABLET ORAL at 12:56

## 2024-10-13 RX ADMIN — Medication 10 ML: at 10:22

## 2024-10-13 RX ADMIN — IPRATROPIUM BROMIDE AND ALBUTEROL SULFATE 1 DOSE: .5; 3 SOLUTION RESPIRATORY (INHALATION) at 13:30

## 2024-10-13 RX ADMIN — IPRATROPIUM BROMIDE AND ALBUTEROL SULFATE 1 DOSE: .5; 3 SOLUTION RESPIRATORY (INHALATION) at 21:51

## 2024-10-13 RX ADMIN — IPRATROPIUM BROMIDE AND ALBUTEROL SULFATE 1 DOSE: .5; 3 SOLUTION RESPIRATORY (INHALATION) at 16:21

## 2024-10-13 RX ADMIN — CHLORHEXIDINE GLUCONATE 15 ML: 1.2 RINSE ORAL at 12:56

## 2024-10-13 RX ADMIN — WATER 1000 MG: 1 INJECTION INTRAMUSCULAR; INTRAVENOUS; SUBCUTANEOUS at 21:40

## 2024-10-13 RX ADMIN — Medication 2 PUFF: at 01:18

## 2024-10-13 RX ADMIN — GUAIFENESIN 600 MG: 600 TABLET, EXTENDED RELEASE ORAL at 08:59

## 2024-10-13 RX ADMIN — AZITHROMYCIN DIHYDRATE 500 MG: 500 INJECTION, POWDER, LYOPHILIZED, FOR SOLUTION INTRAVENOUS at 21:47

## 2024-10-13 RX ADMIN — CHLORHEXIDINE GLUCONATE 15 ML: 1.2 RINSE ORAL at 21:43

## 2024-10-13 RX ADMIN — WATER 40 MG: 1 INJECTION INTRAMUSCULAR; INTRAVENOUS; SUBCUTANEOUS at 08:59

## 2024-10-13 ASSESSMENT — PAIN DESCRIPTION - FREQUENCY: FREQUENCY: CONTINUOUS

## 2024-10-13 ASSESSMENT — PAIN DESCRIPTION - PAIN TYPE: TYPE: ACUTE PAIN

## 2024-10-13 ASSESSMENT — PAIN DESCRIPTION - ONSET: ONSET: ON-GOING

## 2024-10-13 ASSESSMENT — PAIN DESCRIPTION - ORIENTATION: ORIENTATION: INNER

## 2024-10-13 ASSESSMENT — PAIN SCALES - GENERAL: PAINLEVEL_OUTOF10: 2

## 2024-10-13 ASSESSMENT — PAIN DESCRIPTION - DESCRIPTORS: DESCRIPTORS: ACHING

## 2024-10-13 ASSESSMENT — PAIN DESCRIPTION - LOCATION: LOCATION: THROAT;MOUTH

## 2024-10-13 ASSESSMENT — PAIN - FUNCTIONAL ASSESSMENT: PAIN_FUNCTIONAL_ASSESSMENT: ACTIVITIES ARE NOT PREVENTED

## 2024-10-13 NOTE — H&P
V2.0  History and Physical  The patient is French-speaking.  Hence the hospital approved  system used for this history.    Name:  Brian Zamora /Age/Sex: 1980  (44 y.o. male)   MRN & CSN:  [id number] & [id number] Encounter Date/Time: 10/12/2024 9:10 PM EDT   Location:  [place] [address] PCP: No primary care provider on file.       Hospital Day: 1    Assessment and Plan:   Brian Zamora is a 44 y.o. male with a pmh of nicotine dependence.  He presents with worsening cough, SOB and wheezing of 3weeks duration not responsive to medications he has taken so far.  CT chest scattered groundglass opacities in bilateral lungs likely postinflammatory, infectious or pneumonia.  He is being admitted for pneumonia, and possible new onset COPD.    Hospital Problems             Last Modified POA    * (Principal) Multifocal pneumonia 10/12/2024 Yes       Plan:  # Multifocal pneumonia.  # Dyspnea with nonproductive cough.  # Possible new onset COPD.  -CT chest : No PE.  Scattered groundglass opacities in bilateral lungs likely postinflammatory, infectious or pneumonia.  Scattered secretions in the airway.  -COVID and flu swab pending.  -No known history of asthma or COPD and no history of pulmonary tuberculosis.  -Admit to [facility] telemetry.  -Steroid therapy.  -Inhalers and nebulizations.  -Supplemental oxygen as needed.  -Azithromycin and Rocephin.    # Chest and back pain with coughing.  # Elevated troponin.  -Mildly elevated troponin of 29, repeat 28.  -EKG without acute ischemia.  -Trend troponin.  -Check fasting lipid panel, TSH in AM.    # Nicotine dependence.  Counseled on the need to quit.  Nicotine patch.      Disposition:   Current Living situation: Home  Expected Disposition: Home  Estimated D/C: 1 to 2 days    Diet ADULT DIET; Regular   DVT Prophylaxis [x] Lovenox, []  Heparin, [] SCDs, [] Ambulation,  [] Eliquis, [] Xarelto, [] Coumadin   Code Status Full Code   Surrogate Decision Maker/ POA Self,  TECHNIQUE: CT of the head was performed without the administration of intravenous contrast. Automated exposure control, iterative reconstruction, and/or weight based adjustment of the mA/kV was utilized to reduce the radiation dose to as low as reasonably achievable.; CT of the cervical spine was performed without the administration of intravenous contrast. Multiplanar reformatted images are provided for review. Automated exposure control, iterative reconstruction, and/or weight based adjustment of the mA/kV was utilized to reduce the radiation dose to as low as reasonably achievable. COMPARISON: None. HISTORY: ORDERING SYSTEM PROVIDED HISTORY: trauma TECHNOLOGIST PROVIDED HISTORY: Reason for exam:->trauma Has a \"code stroke\" or \"stroke alert\" been called?->No Decision Support Exception - unselect if not a suspected or confirmed emergency medical condition->Emergency Medical Condition (MA) Reason for Exam: trauma; ORDERING SYSTEM PROVIDED HISTORY: trauma TECHNOLOGIST PROVIDED HISTORY: Reason for exam:->trauma Decision Support Exception - unselect if not a suspected or confirmed emergency medical condition->Emergency Medical Condition (MA) Reason for Exam: trauma FINDINGS: BRAIN/VENTRICLES: There is no acute intracranial hemorrhage, mass effect or midline shift.  No abnormal extra-axial fluid collection.  The gray-white differentiation is maintained without evidence of an acute infarct.  There is no evidence of hydrocephalus. ORBITS: The visualized portion of the orbits demonstrate no acute abnormality. SINUSES: The visualized paranasal sinuses and mastoid air cells demonstrate no acute abnormality. SOFT TISSUES/SKULL:  No acute abnormality of the visualized skull or soft tissues. CERVICAL SPINE BONES/ALIGNMENT: There is no acute fracture or traumatic malalignment.  There is straightening of expected cervical lordosis. DEGENERATIVE CHANGES: No significant degenerative changes. SOFT TISSUES: There is no prevertebral

## 2024-10-13 NOTE — PROGRESS NOTES
4 Eyes Skin Assessment     NAME:  Brian Zamora  YOB: 1980  MEDICAL RECORD NUMBER:  [id number]    The patient is being assessed for  Admission    I agree that at least one RN has performed a thorough Head to Toe Skin Assessment on the patient. ALL assessment sites listed below have been assessed.      Areas assessed by both nurses:    Head, Face, Ears, Shoulders, Back, Chest, Arms, Elbows, Hands, Sacrum. Buttock, Coccyx, Ischium, Legs. Feet and Heels, and Under Medical Devices         Does the Patient have a Wound? No noted wound(s)       Benjamin Prevention initiated by RN: No  Wound Care Orders initiated by RN: No    Pressure Injury (Stage 3,4, Unstageable, DTI, NWPT, and Complex wounds) if present, place Wound referral order by RN under : No    New Ostomies, if present place, Ostomy referral order under : No     Nurse 1 eSignature: Electronically signed by Eve Shepherd RN on 10/13/24 at 6:48 AM EDT    **SHARE this note so that the co-signing nurse can place an eSignature**    Nurse 2 eSignature: Electronically signed by Queenie Kim RN on 10/13/24 at 6:49 AM EDT

## 2024-10-13 NOTE — PROGRESS NOTES
10/13/24 0123   RT Protocol   History Pulmonary Disease 2   Respiratory pattern 2   Breath sounds 6   Cough 0   Indications for Bronchodilator Therapy On home bronchodilators;Wheezing associated with pulm disorder   Bronchodilator Assessment Score 10

## 2024-10-13 NOTE — PLAN OF CARE
Problem: Safety - Adult  Goal: Free from fall injury  10/13/2024 1037 by Lilian Hazel RN  Outcome: Progressing     Problem: Respiratory - Adult  Goal: Achieves optimal ventilation and oxygenation  Outcome: Progressing

## 2024-10-13 NOTE — PROGRESS NOTES
Vitals:    10/13/24 0855   BP: 118/75   Pulse: 85   Resp: 18   Temp: 98.5 °F (36.9 °C)   SpO2: 97%     Patient's lung with insp/exp wheezes. Patient complains of gums and throat sore 2/10. Given ice chips and popsicles. Patient swabbed for resp panel. Patient speaks Ecuadorean and a bit of english. Patient up ad cruz in the room. Will monitor.

## 2024-10-13 NOTE — RT PROTOCOL NOTE
RT Inhaler-Nebulizer Bronchodilator Protocol Note    There is a bronchodilator order in the chart from a provider indicating to follow the RT Bronchodilator Protocol and there is an “Initiate RT Inhaler-Nebulizer Bronchodilator Protocol” order as well (see protocol at bottom of note).    CXR Findings:  No results found.    The findings from the last RT Protocol Assessment were as follows:   History Pulmonary Disease: Chronic pulmonary disease  Respiratory Pattern: Dyspnea on exertion or RR 21-25 bpm  Breath Sounds: Inspiratory and expiratory or bilateral wheezing and/or rhonchi  Cough: Strong, spontaneous, non-productive  Indication for Bronchodilator Therapy: On home bronchodilators, Wheezing associated with pulm disorder  Bronchodilator Assessment Score: 10    Aerosolized bronchodilator medication orders have been revised according to the RT Inhaler-Nebulizer Bronchodilator Protocol below.    Respiratory Therapist to perform RT Therapy Protocol Assessment initially then follow the protocol.  Repeat RT Therapy Protocol Assessment PRN for score 0-3 or on second treatment, BID, and PRN for scores above 3.    No Indications - adjust the frequency to every 6 hours PRN wheezing or bronchospasm, if no treatments needed after 48 hours then discontinue using Per Protocol order mode.     If indication present, adjust the RT bronchodilator orders based on the Bronchodilator Assessment Score as indicated below.  Use Inhaler orders unless patient has one or more of the following: on home nebulizer, not able to hold breath for 10 seconds, is not alert and oriented, cannot activate and use MDI correctly, or respiratory rate 25 breaths per minute or more, then use the equivalent nebulizer order(s) with same Frequency and PRN reasons based on the score.  If a patient is on this medication at home then do not decrease Frequency below that used at home.    0-3 - enter or revise RT bronchodilator order(s) to equivalent RT  non-productive  Indication for Bronchodilator Therapy: On home bronchodilators, Wheezing associated with pulm disorder  Bronchodilator Assessment Score: 10    Aerosolized bronchodilator medication orders have been revised according to the RT Inhaler-Nebulizer Bronchodilator Protocol below.    Respiratory Therapist to perform RT Therapy Protocol Assessment initially then follow the protocol.  Repeat RT Therapy Protocol Assessment PRN for score 0-3 or on second treatment, BID, and PRN for scores above 3.    No Indications - adjust the frequency to every 6 hours PRN wheezing or bronchospasm, if no treatments needed after 48 hours then discontinue using Per Protocol order mode.     If indication present, adjust the RT bronchodilator orders based on the Bronchodilator Assessment Score as indicated below.  Use Inhaler orders unless patient has one or more of the following: on home nebulizer, not able to hold breath for 10 seconds, is not alert and oriented, cannot activate and use MDI correctly, or respiratory rate 25 breaths per minute or more, then use the equivalent nebulizer order(s) with same Frequency and PRN reasons based on the score.  If a patient is on this medication at home then do not decrease Frequency below that used at home.    0-3 - enter or revise RT bronchodilator order(s) to equivalent RT Bronchodilator order with Frequency of every 4 hours PRN for wheezing or increased work of breathing using Per Protocol order mode.        4-6 - enter or revise RT Bronchodilator order(s) to two equivalent RT bronchodilator orders with one order with BID Frequency and one order with Frequency of every 4 hours PRN wheezing or increased work of breathing using Per Protocol order mode.        7-10 - enter or revise RT Bronchodilator order(s) to two equivalent RT bronchodilator orders with one order with TID Frequency and one order with Frequency of every 4 hours PRN wheezing or increased work of breathing using Per

## 2024-10-13 NOTE — ED NOTES
Interpretor used to update pt on plan of care, pt denies any pain at this time, given po fluids, tolerated without complaints, denies any other needs or questions at this time

## 2024-10-13 NOTE — PROGRESS NOTES
V2.0    [facility] Progress Note      Name:  Brian Zamora /Age/Sex: 1980  (44 y.o. male)   MRN & CSN:  [id number] & [id number] Encounter Date/Time: 10/13/2024 12:44 PM EDT   Location:  [facility]-5580/5580-01 PCP: No primary care provider on file.     Attending:India Foster MD       Hospital Day: 2    Assessment and Recommendations   Brian Zamora is a 44 y.o. male with pmh of nicotine dependence.  He presents with worsening cough, SOB and wheezing of 3weeks duration not responsive to medications he has taken so far.  CT chest scattered groundglass opacities in bilateral lungs likely postinflammatory, infectious or pneumonia.        Plan:     # Multifocal pneumonia.  # Dyspnea with nonproductive cough.  # Possible new onset COPD.  -CT chest : No PE.  Scattered groundglass opacities in bilateral lungs likely postinflammatory, infectious or pneumonia.  Scattered secretions in the airway.  -Respiratory viral panel pending  -No known history of asthma or COPD and no history of pulmonary tuberculosis.  -Steroid therapy.  -Inhalers and nebulizations.  -Supplemental oxygen as needed.  -Azithromycin and Rocephin.     # Chest and back pain with coughing.  # Elevated troponin.  -Mildly elevated troponin of 29, repeat 28.  -EKG without acute ischemia.  -Trend troponin.  -Check fasting lipid panel, TSH in AM.     # Nicotine dependence.  Counseled on the need to quit.  Nicotine patch.    Plaque associated gingivitis and periodontal disease:  Continue vitamin C and Peridex.  Patient advised to floss and brush twice daily.  Outpatient follow-up with dentist.  Continue antibiotics.    Diet ADULT DIET; Regular   DVT Prophylaxis [x] Lovenox, []  Heparin, [] SCDs, [] Ambulation,  [] Eliquis, [] Xarelto  [] Coumadin   Code Status Full Code   Disposition From: Home  Expected Disposition: Home  Estimated Date of Discharge: 10/14  Patient requires continued admission due to SOB   Surrogate Decision Maker/ Pippa Kincaid

## 2024-10-13 NOTE — PROGRESS NOTES
10/13/24 0124   RT Protocol   History Pulmonary Disease 2   Respiratory pattern 2   Breath sounds 6   Cough 0   Indications for Bronchodilator Therapy On home bronchodilators;Wheezing associated with pulm disorder   Bronchodilator Assessment Score 10

## 2024-10-14 VITALS
BODY MASS INDEX: 25.43 KG/M2 | SYSTOLIC BLOOD PRESSURE: 122 MMHG | HEART RATE: 79 BPM | TEMPERATURE: 97.9 F | DIASTOLIC BLOOD PRESSURE: 74 MMHG | OXYGEN SATURATION: 99 % | RESPIRATION RATE: 16 BRPM | WEIGHT: 177.6 LBS | HEIGHT: 70 IN

## 2024-10-14 LAB
EST. AVERAGE GLUCOSE BLD GHB EST-MCNC: 139.9 MG/DL
HBA1C MFR BLD: 6.5 %
S PNEUM AG UR QL: NORMAL

## 2024-10-14 PROCEDURE — 6370000000 HC RX 637 (ALT 250 FOR IP): Performed by: INTERNAL MEDICINE

## 2024-10-14 PROCEDURE — 94761 N-INVAS EAR/PLS OXIMETRY MLT: CPT

## 2024-10-14 PROCEDURE — 94640 AIRWAY INHALATION TREATMENT: CPT

## 2024-10-14 PROCEDURE — 6360000002 HC RX W HCPCS: Performed by: NURSE PRACTITIONER

## 2024-10-14 PROCEDURE — 6370000000 HC RX 637 (ALT 250 FOR IP): Performed by: NURSE PRACTITIONER

## 2024-10-14 PROCEDURE — 2580000003 HC RX 258: Performed by: NURSE PRACTITIONER

## 2024-10-14 RX ORDER — NICOTINE 21 MG/24HR
1 PATCH, TRANSDERMAL 24 HOURS TRANSDERMAL DAILY
Qty: 42 PATCH | Refills: 0 | Status: SHIPPED | OUTPATIENT
Start: 2024-10-14 | End: 2024-11-25

## 2024-10-14 RX ORDER — ALBUTEROL SULFATE 90 UG/1
2 INHALANT RESPIRATORY (INHALATION) 4 TIMES DAILY PRN
Qty: 54 G | Refills: 1 | Status: SHIPPED | OUTPATIENT
Start: 2024-10-14 | End: 2024-10-17 | Stop reason: SDUPTHER

## 2024-10-14 RX ORDER — PREDNISONE 20 MG/1
40 TABLET ORAL DAILY
Qty: 10 TABLET | Refills: 0 | Status: SHIPPED | OUTPATIENT
Start: 2024-10-15 | End: 2024-10-20

## 2024-10-14 RX ORDER — LEVOFLOXACIN 750 MG/1
750 TABLET, FILM COATED ORAL DAILY
Qty: 7 TABLET | Refills: 0 | Status: SHIPPED | OUTPATIENT
Start: 2024-10-14 | End: 2024-10-14 | Stop reason: HOSPADM

## 2024-10-14 RX ORDER — ASCORBIC ACID 500 MG
500 TABLET ORAL DAILY
Qty: 30 TABLET | Refills: 3 | Status: SHIPPED | OUTPATIENT
Start: 2024-10-15

## 2024-10-14 RX ORDER — CHLORHEXIDINE GLUCONATE ORAL RINSE 1.2 MG/ML
15 SOLUTION DENTAL 2 TIMES DAILY
Qty: 420 ML | Refills: 0 | Status: SHIPPED | OUTPATIENT
Start: 2024-10-14 | End: 2024-10-28

## 2024-10-14 RX ORDER — GUAIFENESIN/DEXTROMETHORPHAN 100-10MG/5
5 SYRUP ORAL EVERY 4 HOURS PRN
Qty: 120 ML | Refills: 0 | Status: SHIPPED | OUTPATIENT
Start: 2024-10-14 | End: 2024-10-24

## 2024-10-14 RX ADMIN — IPRATROPIUM BROMIDE AND ALBUTEROL SULFATE 1 DOSE: .5; 3 SOLUTION RESPIRATORY (INHALATION) at 07:50

## 2024-10-14 RX ADMIN — GUAIFENESIN 600 MG: 600 TABLET, EXTENDED RELEASE ORAL at 08:23

## 2024-10-14 RX ADMIN — OXYCODONE HYDROCHLORIDE AND ACETAMINOPHEN 500 MG: 500 TABLET ORAL at 08:23

## 2024-10-14 RX ADMIN — IPRATROPIUM BROMIDE AND ALBUTEROL SULFATE 1 DOSE: .5; 3 SOLUTION RESPIRATORY (INHALATION) at 15:20

## 2024-10-14 RX ADMIN — IPRATROPIUM BROMIDE AND ALBUTEROL SULFATE 1 DOSE: .5; 3 SOLUTION RESPIRATORY (INHALATION) at 11:21

## 2024-10-14 RX ADMIN — Medication 10 ML: at 08:23

## 2024-10-14 RX ADMIN — PREDNISONE 40 MG: 20 TABLET ORAL at 08:23

## 2024-10-14 RX ADMIN — CHLORHEXIDINE GLUCONATE 15 ML: 1.2 RINSE ORAL at 08:26

## 2024-10-14 RX ADMIN — ENOXAPARIN SODIUM 40 MG: 100 INJECTION SUBCUTANEOUS at 08:24

## 2024-10-14 NOTE — DISCHARGE SUMMARY
V2.0  Discharge Summary    Name:  Brian Zamora /Age/Sex: 1980 (44 y.o. male)   Admit Date: 10/12/2024  Discharge Date: 10/14/24    MRN & CSN:  [id number] & [id number] Encounter Date and Time 10/14/24 10:44 AM EDT    Attending:  India Foster MD Discharging Provider: India Foster MD       Hospital Course:     Brief HPI: Brian Zamora is a 44 y.o. male with pmh of nicotine dependence, presented with worsening cough, SOB and wheezing of 3 weeks duration not responsive to medications he has taken so far.  CT chest showed scattered groundglass opacities in bilateral lungs likely postinflammatory, infectious or pneumonia.       Multifocal pneumonia.  Dyspnea with diffuse wheezing and non-productive cough.  CT chest : No PE.  Scattered groundglass opacities in bilateral lungs likely postinflammatory, infectious or pneumonia.  Scattered secretions in the airway.  Respiratory viral panel negative.  No known history of asthma or COPD and no history of pulmonary tuberculosis.  Complete short course of steroids  Discharged on albuterol inhaler and antitussives.  Not requiring supplemental oxygen.  Outpatient follow-up with pulmonology.    Elevated troponin: Resolved  Mildly elevated troponin of 29, 28, 18.  EKG without acute ischemia.  Outpatient follow-up with PCP.     Nicotine dependence: Smoking cessation counseling provided.  Nicotine patch ordered.       Plaque associated gingivitis and periodontal disease:  Continue vitamin C, Augmentin and Peridex.  Patient advised to floss and brush twice daily.  Outpatient follow-up with dentist.  Continue antibiotics.      The patient expressed appropriate understanding of, and agreement with the discharge recommendations, medications, and plan.     Consults this admission:  IP CONSULT TO SOCIAL WORK  IP CONSULT TO FINANCIAL COUNSELOR    Discharge Diagnosis:   Multifocal pneumonia      Discharge Instruction:   Follow up appointments: Pulmonology, dentist  Primary  as reasonably achievable. COMPARISON: May 2024 HISTORY: ORDERING SYSTEM PROVIDED HISTORY: SOB, cough TECHNOLOGIST PROVIDED HISTORY: Reason for exam:->SOB, cough Additional Contrast?->1 FINDINGS: Motion artifact degrades image quality.  This decreases sensitivity and specificity of the exam Mediastinum: Thyroid gland unremarkable.  Small mediastinal and hilar nodes are noted.  No pericardial effusion.  No pericardial calcification noted. Intimal flap seen in aorta. No embolus seen in central, right, or left main pulmonary artery.  Segmental branches and subsegmental branches not well evaluated secondary to motion artifact Lungs/pleura: Respiratory motion artifact limits evaluation of the lungs. Hazy ground-glass opacity is seen in the left lower medially. On the right, hazy ground-glass opacity is seen in the anterior inferior right upper lobe, the right middle lobe, and right lower lobe. Scattered areas of bronchial wall thickening are seen bilaterally.  Scattered secretions are seen in the airways Upper Abdomen: Adrenal glands unremarkable.  No acute findings noted in upper abdomen. Soft Tissues/Bones: No acute bony findings noted     No central pulmonary embolus identified.Segmental branches and subsegmental branches not well evaluated secondary to motion artifact Scattered ground-glass opacities are seen throughout the lungs bilaterally likely postinflammatory-infectious/pneumonia.  Scattered secretions are seen in the airways.     CT Head W/O Contrast    Result Date: 10/9/2024  EXAMINATION: CT OF THE HEAD WITHOUT CONTRAST; CT OF THE CERVICAL SPINE WITHOUT CONTRAST 10/9/2024 1:32 pm TECHNIQUE: CT of the head was performed without the administration of intravenous contrast. Automated exposure control, iterative reconstruction, and/or weight based adjustment of the mA/kV was utilized to reduce the radiation dose to as low as reasonably achievable.; CT of the cervical spine was performed without the administration

## 2024-10-14 NOTE — DISCHARGE INSTR - COC
Continuity of Care Form    Patient Name: Brian Zamora   :  1980  MRN:  [id number]    Admit date:  10/12/2024  Discharge date:  ***    Code Status Order: Full Code   Advance Directives:   Advance Care Flowsheet Documentation             Admitting Physician:  Payal Rick DO  PCP: No primary care provider on file.    Discharging Nurse: ***  Discharging Hospital Unit/Room#: 5TN-5580/5580-01  Discharging Unit Phone Number: ***    Emergency Contact:   Extended Emergency Contact Information  Primary Emergency Contact: Pippa benavides  Home Phone: [phone]  Mobile Phone: [phone]  Relation: Child  Preferred language: English   needed? No    Past Surgical History:  History reviewed. No pertinent surgical history.    Immunization History:     There is no immunization history on file for this patient.    Active Problems:  Patient Active Problem List   Diagnosis Code    Multifocal pneumonia J18.9       Isolation/Infection:   Isolation            No Isolation          Patient Infection Status       Infection Onset Added Last Indicated Last Indicated By Review Planned Expiration Resolved Resolved By    None active    Resolved    Influenza 24 COVID-19 & Influenza Combo   24 Infection                        Nurse Assessment:  Last Vital Signs: /74   Pulse 79   Temp 97.9 °F (36.6 °C) (Oral)   Resp 16   Ht 1.778 m (5' 10\")   Wt 80.6 kg (177 lb 9.6 oz)   SpO2 99%   BMI 25.48 kg/m²     Last documented pain score (0-10 scale): Pain Level: 2  Last Weight:   Wt Readings from Last 1 Encounters:   10/12/24 80.6 kg (177 lb 9.6 oz)     Mental Status:  {IP PT MENTAL STATUS:}    IV Access:  { AUGUSTINA IV ACCESS:177290909}    Nursing Mobility/ADLs:  Walking   {CHP DME ADLs:862014330}  Transfer  {CHP DME ADLs:130670301}  Bathing  {CHP DME ADLs:149350291}  Dressing  {CHP DME ADLs:372500380}  Toileting  {CHP DME ADLs:922539876}  Feeding  {CHP DME

## 2024-10-14 NOTE — PROGRESS NOTES
Discharge order noted. Discharge instructions, medications changes, and follow up appointments reviewed with patient using [facility]  services. Verbal understanding stated. Patient is waiting on discharge medications to be delivered at this time. OP pharmacy states they will deliver to bedside.     1600- Discharge medications delivered to bedside. Patient transported to ER entrance where he parked and will drive him self home. Patient condition and vital signs are stable at time of discharge.

## 2024-10-14 NOTE — PROGRESS NOTES
CLINICAL PHARMACY NOTE: MEDS TO BEDS    Total # of Prescriptions Filled: 8   The following medications were delivered to the patient:  ASCORBIC ACID 500MG TABS  NICOTINE 21MG/24HR PT24  ALBUTEROL SULFATE  AERS  AMOXICILLIN - POTCLAVUL 875 - 125 TABS  PREDNISONE 20MG TABS  CHLORHEXIDINE GLUC 0.12% SOL  LEVOFLOXACIN 750MG TABS  KYMBERLY - TUSSIN DM 10 - 100MG/5ML SYRP    Additional Documentation: Marielle CRAFT approved to deliver medications to patient room=signed  [facility] Tech   Yes

## 2024-10-14 NOTE — CARE COORDINATION
Chart reviewed at this time for discharge planning.    Pt from home. No insurance or PCP listed.    CM called Jorge Alberto in [place] [facility] [id number] and she will meet with pt to verify insurance.    If uninsured pt will need meds to [facility] [place] appt on discharge.    CM will follow for discharge plan.    Marcia Lopez RN, BSN  [phone]

## 2024-10-14 NOTE — CARE COORDINATION
Case Management Assessment  Initial Evaluation    Date/Time of Evaluation: 10/14/2024 11:00 AM  Assessment Completed by: JHON WOO RN    If patient is discharged prior to next notation, then this note serves as note for discharge by case management.    Patient Name: Brian Zamora                   YOB: 1980  Diagnosis: Elevated troponin [R79.89]  Multifocal pneumonia [J18.9]                   Date / Time: 10/12/2024  4:06 PM    Patient Admission Status: Inpatient   Readmission Risk (Low < 19, Mod (19-27), High > 27): Readmission Risk Score: 9.7    Current PCP: No primary care provider on file.  PCP verified by CM? Yes    Chart Reviewed: Yes      History Provided by: Patient  Patient Orientation: Alert and Oriented    Patient Cognition: Alert    Hospitalization in the last 30 days (Readmission):  No    If yes, Readmission Assessment in CM Navigator will be completed.    Advance Directives:      Code Status: Full Code   Patient's Primary Decision Maker is: Legal Next of Kin      Discharge Planning:    Patient lives with: Alone Type of Home: Apartment  Primary Care Giver: Self  Patient Support Systems include: Family Members, Friends/Neighbors   Current Financial resources: None  Current community resources: None  Current services prior to admission: None            Current DME:              Type of Home Care services:  None    ADLS  Prior functional level: Independent in ADLs/IADLs  Current functional level: Independent in ADLs/IADLs    PT AM-PAC:   /24  OT AM-PAC:   /24    Family can provide assistance at DC: No  Would you like Case Management to discuss the discharge plan with any other family members/significant others, and if so, who? No  Plans to Return to Present Housing: Yes  Other Identified Issues/Barriers to RETURNING to current housing: none  Potential Assistance needed at discharge: N/A            Potential DME:    Patient expects to discharge to: Apartment  Plan for transportation at

## 2024-10-14 NOTE — CARE COORDINATION
Case Management -  Discharge Note      Patient Name: Brian Zamora                   YOB: 1980            Readmission Risk (Low < 19, Mod (19-27), High > 27): Readmission Risk Score: 9.7    Current PCP: No primary care provider on file.      (IMM) Important Message from Medicare:    Has pt received appropriate IMM before discharge if required: N/A  Date: n/a    PT AM-PAC:   /24  OT AM-PAC:   /24    Financial screening pt for assistance due to no insurance. Outpt pharmacy aware and pt will need meds to bed.      Follow up appt made by CM :     AN APPOINTMENT HAS BEEN SCHEDULED FOR YOU ON:   NOVEMBER 7TH AT 9:40 AM AT    [facility]    ADDRESS: [address] [place] [address]    PHONE: [phone]    CM provided dental clinic list to patient     Financial    Payor: /     Pharmacy:  Potential assistance Purchasing Medications: Yes  Meds-to-Beds request: Yes      [id number] - [place], [place] - [address] - P [phone] - F [phone]  [address] [facility] [address]  Phone: [phone] Fax: [phone]      Notes:    Additional Case Management Notes: Patient discharged 10/14/2024 to home with meds to bed and dental clinic resources and follow up [facility] appt    Marcia Lopez RN, BSN  [phone]   All discharge needs met per case management

## 2024-10-14 NOTE — PROGRESS NOTES
Morning assessments and vital signs complete. Patient given scheduled medications as prescribed. He denies pain at this time. Morning labs reviewed. Patient is alert and oriented and able to ambulate independently in the room.

## 2024-10-17 ENCOUNTER — OFFICE VISIT (OUTPATIENT)
Dept: PULMONOLOGY | Age: 44
End: 2024-10-17

## 2024-10-17 VITALS
SYSTOLIC BLOOD PRESSURE: 116 MMHG | HEIGHT: 70 IN | DIASTOLIC BLOOD PRESSURE: 74 MMHG | HEART RATE: 79 BPM | OXYGEN SATURATION: 98 % | BODY MASS INDEX: 25.48 KG/M2

## 2024-10-17 DIAGNOSIS — F17.210 CIGARETTE NICOTINE DEPENDENCE WITHOUT COMPLICATION: ICD-10-CM

## 2024-10-17 DIAGNOSIS — J18.9 MULTIFOCAL PNEUMONIA: Primary | ICD-10-CM

## 2024-10-17 DIAGNOSIS — J45.40 MODERATE PERSISTENT REACTIVE AIRWAY DISEASE WITHOUT COMPLICATION: ICD-10-CM

## 2024-10-17 RX ORDER — ALBUTEROL SULFATE 90 UG/1
2 INHALANT RESPIRATORY (INHALATION) 4 TIMES DAILY PRN
Qty: 54 G | Refills: 11 | Status: SHIPPED | OUTPATIENT
Start: 2024-10-17

## 2024-10-17 ASSESSMENT — ENCOUNTER SYMPTOMS
VOMITING: 0
COUGH: 1
COLOR CHANGE: 0
CONSTIPATION: 0
CHOKING: 0
BACK PAIN: 0
SHORTNESS OF BREATH: 1
ABDOMINAL PAIN: 0
ABDOMINAL DISTENTION: 0
APNEA: 0
WHEEZING: 0
VOICE CHANGE: 0
BLOOD IN STOOL: 0
STRIDOR: 0
CHEST TIGHTNESS: 0
DIARRHEA: 0
SINUS PRESSURE: 0
SORE THROAT: 0
RHINORRHEA: 0

## 2024-10-17 NOTE — PROGRESS NOTES
pneumonia  albuterol sulfate HFA (VENTOLIN HFA) 108 (90 Base) MCG/ACT inhaler      2. Moderate persistent reactive airway disease without complication  Full PFT Study With Bronchodilator      3.  Nicotine dependence  I personally reviewed CTA chest performed on 10/12, which shows patchy bilateral groundglass opacities which most likely suggest a postinflammatory process.  Given patient's smoking history, this could represent DIP or RB ILD.  No lung nodules are noted.    Chronic cough and shortness of breath, could represent reactive airway disease.  Start Qvar 80 mcg, 1 puff twice daily and continue with albuterol inhaler as needed.  Obtain complete PFT study to evaluate for obstructive airway disease.    Will consider repeat CT imaging, preferably HRCT in 3 months which can be requested at the next visit.    Discussed at length about smoking cessation, which could be responsible to patient's current symptoms.  He states that he already has nicotine patch available at home which he has been using.  He understands the importance of quitting the habit.  A total of 12 minutes was spent for smoking cessation counseling.    Return in about 1 month (around 11/17/2024).

## 2024-11-07 ENCOUNTER — OFFICE VISIT (OUTPATIENT)
Age: 44
End: 2024-11-07

## 2024-11-07 VITALS
HEART RATE: 66 BPM | DIASTOLIC BLOOD PRESSURE: 68 MMHG | BODY MASS INDEX: 26.05 KG/M2 | OXYGEN SATURATION: 99 % | HEIGHT: 70 IN | SYSTOLIC BLOOD PRESSURE: 110 MMHG | WEIGHT: 182 LBS

## 2024-11-07 DIAGNOSIS — D58.2 HEMOGLOBINOPATHY (HCC): ICD-10-CM

## 2024-11-07 DIAGNOSIS — R79.89 ABNORMAL TSH: ICD-10-CM

## 2024-11-07 DIAGNOSIS — J45.40 MODERATE PERSISTENT REACTIVE AIRWAY DISEASE WITHOUT COMPLICATION: ICD-10-CM

## 2024-11-07 DIAGNOSIS — E11.9 TYPE 2 DIABETES MELLITUS WITHOUT COMPLICATION, WITHOUT LONG-TERM CURRENT USE OF INSULIN (HCC): Primary | ICD-10-CM

## 2024-11-07 DIAGNOSIS — Z72.0 TOBACCO ABUSE: ICD-10-CM

## 2024-11-07 DIAGNOSIS — F32.9 REACTIVE DEPRESSION: ICD-10-CM

## 2024-11-07 DIAGNOSIS — J18.9 MULTIFOCAL PNEUMONIA: ICD-10-CM

## 2024-11-07 PROBLEM — J45.909 REACTIVE AIRWAY DISEASE WITHOUT COMPLICATION: Status: ACTIVE | Noted: 2024-11-07

## 2024-11-07 RX ORDER — NICOTINE 21 MG/24HR
1 PATCH, TRANSDERMAL 24 HOURS TRANSDERMAL DAILY
Qty: 14 PATCH | Refills: 0 | Status: SHIPPED | OUTPATIENT
Start: 2024-11-07 | End: 2024-11-21

## 2024-11-07 SDOH — ECONOMIC STABILITY: FOOD INSECURITY: WITHIN THE PAST 12 MONTHS, THE FOOD YOU BOUGHT JUST DIDN'T LAST AND YOU DIDN'T HAVE MONEY TO GET MORE.: NEVER TRUE

## 2024-11-07 SDOH — ECONOMIC STABILITY: FOOD INSECURITY: WITHIN THE PAST 12 MONTHS, YOU WORRIED THAT YOUR FOOD WOULD RUN OUT BEFORE YOU GOT MONEY TO BUY MORE.: OFTEN TRUE

## 2024-11-07 SDOH — ECONOMIC STABILITY: INCOME INSECURITY: HOW HARD IS IT FOR YOU TO PAY FOR THE VERY BASICS LIKE FOOD, HOUSING, MEDICAL CARE, AND HEATING?: VERY HARD

## 2024-11-07 ASSESSMENT — ANXIETY QUESTIONNAIRES
2. NOT BEING ABLE TO STOP OR CONTROL WORRYING: NOT AT ALL
GAD7 TOTAL SCORE: 2
7. FEELING AFRAID AS IF SOMETHING AWFUL MIGHT HAPPEN: NOT AT ALL
5. BEING SO RESTLESS THAT IT IS HARD TO SIT STILL: NOT AT ALL
IF YOU CHECKED OFF ANY PROBLEMS ON THIS QUESTIONNAIRE, HOW DIFFICULT HAVE THESE PROBLEMS MADE IT FOR YOU TO DO YOUR WORK, TAKE CARE OF THINGS AT HOME, OR GET ALONG WITH OTHER PEOPLE: NOT DIFFICULT AT ALL
3. WORRYING TOO MUCH ABOUT DIFFERENT THINGS: MORE THAN HALF THE DAYS
6. BECOMING EASILY ANNOYED OR IRRITABLE: NOT AT ALL
1. FEELING NERVOUS, ANXIOUS, OR ON EDGE: NOT AT ALL
4. TROUBLE RELAXING: NOT AT ALL

## 2024-11-07 ASSESSMENT — PATIENT HEALTH QUESTIONNAIRE - PHQ9
SUM OF ALL RESPONSES TO PHQ QUESTIONS 1-9: 10
9. THOUGHTS THAT YOU WOULD BE BETTER OFF DEAD, OR OF HURTING YOURSELF: NOT AT ALL
SUM OF ALL RESPONSES TO PHQ QUESTIONS 1-9: 10
SUM OF ALL RESPONSES TO PHQ QUESTIONS 1-9: 10
3. TROUBLE FALLING OR STAYING ASLEEP: NOT AT ALL
10. IF YOU CHECKED OFF ANY PROBLEMS, HOW DIFFICULT HAVE THESE PROBLEMS MADE IT FOR YOU TO DO YOUR WORK, TAKE CARE OF THINGS AT HOME, OR GET ALONG WITH OTHER PEOPLE: SOMEWHAT DIFFICULT
SUM OF ALL RESPONSES TO PHQ QUESTIONS 1-9: 10
4. FEELING TIRED OR HAVING LITTLE ENERGY: MORE THAN HALF THE DAYS
7. TROUBLE CONCENTRATING ON THINGS, SUCH AS READING THE NEWSPAPER OR WATCHING TELEVISION: NOT AT ALL
6. FEELING BAD ABOUT YOURSELF - OR THAT YOU ARE A FAILURE OR HAVE LET YOURSELF OR YOUR FAMILY DOWN: NEARLY EVERY DAY
8. MOVING OR SPEAKING SO SLOWLY THAT OTHER PEOPLE COULD HAVE NOTICED. OR THE OPPOSITE, BEING SO FIGETY OR RESTLESS THAT YOU HAVE BEEN MOVING AROUND A LOT MORE THAN USUAL: NOT AT ALL
5. POOR APPETITE OR OVEREATING: MORE THAN HALF THE DAYS
SUM OF ALL RESPONSES TO PHQ9 QUESTIONS 1 & 2: 3
2. FEELING DOWN, DEPRESSED OR HOPELESS: NEARLY EVERY DAY
1. LITTLE INTEREST OR PLEASURE IN DOING THINGS: NOT AT ALL

## 2024-11-07 ASSESSMENT — ENCOUNTER SYMPTOMS
EYE PAIN: 0
EYE REDNESS: 0
COUGH: 1
WHEEZING: 1
SHORTNESS OF BREATH: 1
ABDOMINAL PAIN: 0
BLOOD IN STOOL: 0
SINUS PRESSURE: 0

## 2024-11-07 NOTE — ASSESSMENT & PLAN NOTE
Has been started on QVAR by pulmonary with prn albuterol. He has been over-using the albuterol, was counseled on appropriately using this as a rescue inhaler.

## 2024-11-07 NOTE — PROGRESS NOTES
Cardiovascular:      Rate and Rhythm: Normal rate and regular rhythm.   Pulmonary:      Breath sounds: Rhonchi present. No wheezing or rales.   Abdominal:      General: There is no distension.      Palpations: Abdomen is soft.      Tenderness: There is no abdominal tenderness.   Musculoskeletal:      Cervical back: No rigidity.      Right lower leg: No edema.      Left lower leg: No edema.   Lymphadenopathy:      Cervical: No cervical adenopathy.   Skin:     General: Skin is warm and dry.      Findings: No rash.   Neurological:      General: No focal deficit present.      Mental Status: He is alert and oriented to person, place, and time.   Psychiatric:         Mood and Affect: Mood normal.         Behavior: Behavior normal.           An electronic signature was used to authenticate this note.    Electronically signed by Cortez Tran MD on 11/7/2024 at 10:47 AM.

## 2024-11-07 NOTE — ASSESSMENT & PLAN NOTE
HbA1c 6.5% during recent hospitalization  Start metformin 500mg once daily for one week then increase to BID. Will also refer to diabetic education. Check microalbumin.

## 2024-11-07 NOTE — PATIENT INSTRUCTIONS
Vaccine Information Sheet: Influenza , PCV, and Tdap  given to Brian Zamora for review and verbalized understanding of material. Brian Zamora was given the opportunity to ask questions.   Documented vaccine given per order.     VIS given and made available within patient's chart for future reference.

## 2024-11-15 ENCOUNTER — OFFICE VISIT (OUTPATIENT)
Dept: PULMONOLOGY | Age: 44
End: 2024-11-15

## 2024-11-15 VITALS
HEART RATE: 65 BPM | HEIGHT: 70 IN | OXYGEN SATURATION: 99 % | DIASTOLIC BLOOD PRESSURE: 74 MMHG | WEIGHT: 183.4 LBS | SYSTOLIC BLOOD PRESSURE: 118 MMHG | BODY MASS INDEX: 26.26 KG/M2

## 2024-11-15 DIAGNOSIS — R91.8 LUNG INFILTRATE ON CT: ICD-10-CM

## 2024-11-15 DIAGNOSIS — J45.20 MILD INTERMITTENT REACTIVE AIRWAY DISEASE WITHOUT COMPLICATION: Primary | ICD-10-CM

## 2024-11-15 RX ORDER — ALBUTEROL SULFATE 90 UG/1
2 INHALANT RESPIRATORY (INHALATION) 4 TIMES DAILY PRN
Qty: 54 G | Refills: 11 | Status: SHIPPED | OUTPATIENT
Start: 2024-11-15

## 2024-11-15 ASSESSMENT — ENCOUNTER SYMPTOMS
CHOKING: 0
VOMITING: 0
SINUS PRESSURE: 0
CONSTIPATION: 0
RHINORRHEA: 0
ABDOMINAL DISTENTION: 0
CHEST TIGHTNESS: 0
STRIDOR: 0
BACK PAIN: 0
WHEEZING: 0
VOICE CHANGE: 0
SHORTNESS OF BREATH: 0
SORE THROAT: 0
DIARRHEA: 0
APNEA: 0
COLOR CHANGE: 0
ABDOMINAL PAIN: 0
BLOOD IN STOOL: 0
COUGH: 1

## 2024-11-15 NOTE — PROGRESS NOTES
ear pain, postnasal drip, rhinorrhea, sinus pressure, sneezing, sore throat, tinnitus and voice change.    Respiratory:  Positive for cough. Negative for apnea, choking, chest tightness, shortness of breath, wheezing and stridor.    Cardiovascular:  Negative for chest pain, palpitations and leg swelling.   Gastrointestinal:  Negative for abdominal distention, abdominal pain, blood in stool, constipation, diarrhea and vomiting.   Musculoskeletal:  Negative for arthralgias, back pain and gait problem.   Skin:  Negative for color change, pallor and rash.   Allergic/Immunologic: Negative for environmental allergies.   Neurological:  Negative for dizziness, tremors, seizures, syncope, speech difficulty, weakness, light-headedness, numbness and headaches.   Hematological:  Negative for adenopathy. Does not bruise/bleed easily.   Psychiatric/Behavioral:  Negative for sleep disturbance.        Vitals:    11/15/24 0840   BP: 118/74   Site: Right Upper Arm   Position: Sitting   Cuff Size: Medium Adult   Pulse: 65   SpO2: 99%   Weight: 83.2 kg (183 lb 6.4 oz)   Height: 1.778 m (5' 10\")          No data to display               Body mass index is 26.32 kg/m².     Wt Readings from Last 3 Encounters:   11/15/24 83.2 kg (183 lb 6.4 oz)   11/07/24 82.6 kg (182 lb)   10/12/24 80.6 kg (177 lb 9.6 oz)     BP Readings from Last 3 Encounters:   11/15/24 118/74   11/07/24 110/68   10/17/24 116/74         Physical Exam  Constitutional:       General: He is not in acute distress.     Appearance: He is well-developed. He is not ill-appearing or diaphoretic.   HENT:      Nose: No congestion or rhinorrhea.      Mouth/Throat:      Pharynx: No oropharyngeal exudate or posterior oropharyngeal erythema.   Cardiovascular:      Rate and Rhythm: Normal rate and regular rhythm.      Heart sounds: Normal heart sounds. No murmur heard.     No friction rub.   Pulmonary:      Effort: No respiratory distress.      Breath sounds: Normal breath sounds. No

## 2024-11-30 ENCOUNTER — APPOINTMENT (OUTPATIENT)
Dept: GENERAL RADIOLOGY | Age: 44
End: 2024-11-30
Payer: MEDICAID

## 2024-11-30 ENCOUNTER — HOSPITAL ENCOUNTER (EMERGENCY)
Age: 44
Discharge: HOME | End: 2024-11-30
Attending: EMERGENCY MEDICINE
Payer: MEDICAID

## 2024-11-30 VITALS
BODY MASS INDEX: 26.03 KG/M2 | TEMPERATURE: 97.6 F | DIASTOLIC BLOOD PRESSURE: 81 MMHG | HEART RATE: 86 BPM | SYSTOLIC BLOOD PRESSURE: 131 MMHG | OXYGEN SATURATION: 99 % | HEIGHT: 70 IN | WEIGHT: 181.8 LBS | RESPIRATION RATE: 18 BRPM

## 2024-11-30 DIAGNOSIS — J45.41 MODERATE PERSISTENT ASTHMA WITH ACUTE EXACERBATION: Primary | ICD-10-CM

## 2024-11-30 PROCEDURE — 6370000000 HC RX 637 (ALT 250 FOR IP): Performed by: EMERGENCY MEDICINE

## 2024-11-30 PROCEDURE — 94640 AIRWAY INHALATION TREATMENT: CPT

## 2024-11-30 PROCEDURE — 71046 X-RAY EXAM CHEST 2 VIEWS: CPT

## 2024-11-30 PROCEDURE — 99283 EMERGENCY DEPT VISIT LOW MDM: CPT

## 2024-11-30 RX ORDER — PREDNISONE 50 MG/1
50 TABLET ORAL DAILY
Qty: 5 TABLET | Refills: 0 | Status: SHIPPED | OUTPATIENT
Start: 2024-11-30 | End: 2024-12-05

## 2024-11-30 RX ORDER — IPRATROPIUM BROMIDE AND ALBUTEROL SULFATE 2.5; .5 MG/3ML; MG/3ML
1 SOLUTION RESPIRATORY (INHALATION) ONCE
Status: COMPLETED | OUTPATIENT
Start: 2024-11-30 | End: 2024-11-30

## 2024-11-30 RX ORDER — ALBUTEROL SULFATE 90 UG/1
2 INHALANT RESPIRATORY (INHALATION) ONCE
Status: COMPLETED | OUTPATIENT
Start: 2024-11-30 | End: 2024-11-30

## 2024-11-30 RX ORDER — GUAIFENESIN/DEXTROMETHORPHAN 100-10MG/5
5 SYRUP ORAL 4 TIMES DAILY PRN
Qty: 120 ML | Refills: 0 | Status: SHIPPED | OUTPATIENT
Start: 2024-11-30 | End: 2024-12-10

## 2024-11-30 RX ORDER — PREDNISONE 20 MG/1
60 TABLET ORAL ONCE
Status: COMPLETED | OUTPATIENT
Start: 2024-11-30 | End: 2024-11-30

## 2024-11-30 RX ADMIN — Medication 2 PUFF: at 07:54

## 2024-11-30 RX ADMIN — PREDNISONE 60 MG: 20 TABLET ORAL at 06:43

## 2024-11-30 RX ADMIN — IPRATROPIUM BROMIDE AND ALBUTEROL SULFATE 1 DOSE: .5; 3 SOLUTION RESPIRATORY (INHALATION) at 07:01

## 2024-11-30 RX ADMIN — IPRATROPIUM BROMIDE AND ALBUTEROL SULFATE 1 DOSE: .5; 3 SOLUTION RESPIRATORY (INHALATION) at 07:02

## 2024-11-30 ASSESSMENT — LIFESTYLE VARIABLES
HOW OFTEN DO YOU HAVE A DRINK CONTAINING ALCOHOL: NEVER
HOW MANY STANDARD DRINKS CONTAINING ALCOHOL DO YOU HAVE ON A TYPICAL DAY: PATIENT DOES NOT DRINK

## 2024-11-30 ASSESSMENT — PAIN - FUNCTIONAL ASSESSMENT: PAIN_FUNCTIONAL_ASSESSMENT: NONE - DENIES PAIN

## 2024-11-30 NOTE — DISCHARGE INSTRUCTIONS
Follow-up with your pulmonologist Dr. Jordin Foss in 2 days. Call for an appointment  Follow-up with your primary care physician in 2 days reevaluation.  Call for an appointment  Take prednisone as prescribed for shortness of breath coughing wheezing  Take Robitussin DM as prescribed for cough and cold symptoms  Use albuterol inhaler as prescribed and directed.  Return to the emergency department immediately any pain fever chills nausea vomiting dizzy lightheadedness or any worsening symptoms.

## 2024-11-30 NOTE — ED PROVIDER NOTES
Emergency Department Encounter    Patient: Brian Zamora  MRN: [id number]  : 1980  Date of Evaluation: 2024  ED Provider:  Phyllis Coyle DO    Triage Chief Complaint:   Medication Refill (Pt w c/o cough. Pt stated he was asked to come back to ED for refill as needed)    [place]:  Brian Zamora is a 44 y.o. male with history of pneumonia reactive airway disease diabetes COPD that presents to the emergency department complaint of cough shortness of breath and for medication refill.  Patient French-speaking  used throughout ED course.  Patient states he has had cough shortness of breath for the last couple days getting worse.  States decree sleep at night because he is short of breath.  Patient with history of asthma.  Patient states he ran out of his inhalers.  He states the pharmacy would not give him prescription.  He denies any sick contacts no fevers chills nausea no runny nose or earache.  Admits to tobacco use.  Patient states intermittent sore throat.  Patient here for evaluation.    ROS - see HPI, below listed is current ROS at time of my eval:  10 systems reviewed and negative except as above.     History reviewed. No pertinent past medical history.  History reviewed. No pertinent surgical history.  Family History   Problem Relation Age of Onset    No Known Problems Mother     No Known Problems Father      Social History     Socioeconomic History    Marital status: Single     Spouse name: Not on file    Number of children: Not on file    Years of education: Not on file    Highest education level: Not on file   Occupational History    Not on file   Tobacco Use    Smoking status: Every Day     Current packs/day: 0.00     Types: Cigarettes     Last attempt to quit: 2024     Years since quittin.3     Passive exposure: Past    Smokeless tobacco: Never   Vaping Use    Vaping status: Never Used   Substance and Sexual Activity    Alcohol use: Not Currently    Drug use: Never     is return immediately to the emerged part for any worsening symptoms.  All questions answered patient discharged in stable condition    History from : Patient    Limitations to history : Language Iraqi    Patient was given the following medications:  Medications   ipratropium 0.5 mg-albuterol 2.5 mg (DUONEB) nebulizer solution 1 Dose (1 Dose Inhalation Given 11/30/24 0702)   ipratropium 0.5 mg-albuterol 2.5 mg (DUONEB) nebulizer solution 1 Dose (1 Dose Inhalation Given 11/30/24 0701)   predniSONE (DELTASONE) tablet 60 mg (60 mg Oral Given 11/30/24 0643)   albuterol sulfate HFA (PROVENTIL;VENTOLIN;PROAIR) 108 (90 Base) MCG/ACT inhaler 2 puff (2 puffs Inhalation Given 11/30/24 0754)       Independent Imaging Interpretation by me: Chest x-ray: No obvious infiltrates or effusions.    EKG (if obtained): (All EKG's are interpreted by myself in the absence of a cardiologist)     Chronic conditions affecting care: copd    Social Determinants : None    Records Reviewed : Outpatient Notes patient did see pulmonologist outpatient office visit November 15, 2024.      Disposition Considerations (tests considered but not done, Shared Decision Making, Pt Expectation of Test or Tx.):            Discussion with Other Profesionals : None    Discharge condition: stable    I am the Primary Clinician of Record.    Is this patient to be included in the SEP-1 Core Measure due to severe sepsis or septic shock?   No   Exclusion criteria - the patient is NOT to be included for SEP-1 Core Measure due to:  Infection is not suspected      Clinical Impression:  1. Moderate persistent asthma with acute exacerbation      Disposition referral (if applicable):  Jordin Chong MD  [address] [facility]  [address]  [facility] [address] [phone]    Schedule an appointment as soon as possible for a visit in 2 days  for evaluation of asthma, copd and inhaler management. call monday for an appointment    Cortez Tran MD  [address] [facility]

## 2024-12-03 ENCOUNTER — TELEPHONE (OUTPATIENT)
Dept: PULMONOLOGY | Age: 44
End: 2024-12-03

## 2024-12-03 NOTE — TELEPHONE ENCOUNTER
----- Message from Dr. Jordin Chong MD sent at 12/2/2024  9:05 AM EST -----  This pt needs another follow up soon. Had ER visit with poor asthma control. He is poorly compliant with instruction and needs a [place] .

## 2024-12-30 ENCOUNTER — HOSPITAL ENCOUNTER (EMERGENCY)
Age: 44
Discharge: HOME | End: 2024-12-30
Payer: MEDICAID

## 2024-12-30 VITALS
SYSTOLIC BLOOD PRESSURE: 119 MMHG | WEIGHT: 183.7 LBS | OXYGEN SATURATION: 96 % | HEIGHT: 70 IN | DIASTOLIC BLOOD PRESSURE: 81 MMHG | BODY MASS INDEX: 26.3 KG/M2 | HEART RATE: 87 BPM | RESPIRATION RATE: 16 BRPM | TEMPERATURE: 98.5 F

## 2024-12-30 DIAGNOSIS — J45.21 MILD INTERMITTENT EXACERBATION OF REACTIVE AIRWAY DISEASE: Primary | ICD-10-CM

## 2024-12-30 PROCEDURE — 94761 N-INVAS EAR/PLS OXIMETRY MLT: CPT

## 2024-12-30 PROCEDURE — 6370000000 HC RX 637 (ALT 250 FOR IP): Performed by: PHYSICIAN ASSISTANT

## 2024-12-30 PROCEDURE — 94640 AIRWAY INHALATION TREATMENT: CPT

## 2024-12-30 PROCEDURE — 99283 EMERGENCY DEPT VISIT LOW MDM: CPT

## 2024-12-30 PROCEDURE — 6360000002 HC RX W HCPCS: Performed by: PHYSICIAN ASSISTANT

## 2024-12-30 RX ORDER — METHYLPREDNISOLONE 4 MG/1
TABLET ORAL
Qty: 1 KIT | Refills: 0 | Status: SHIPPED | OUTPATIENT
Start: 2024-12-30

## 2024-12-30 RX ORDER — PREDNISONE 20 MG/1
60 TABLET ORAL ONCE
Status: COMPLETED | OUTPATIENT
Start: 2024-12-30 | End: 2024-12-30

## 2024-12-30 RX ORDER — ALBUTEROL SULFATE 0.83 MG/ML
5 SOLUTION RESPIRATORY (INHALATION) ONCE
Status: COMPLETED | OUTPATIENT
Start: 2024-12-30 | End: 2024-12-30

## 2024-12-30 RX ORDER — ALBUTEROL SULFATE 90 UG/1
INHALANT RESPIRATORY (INHALATION)
Qty: 18 G | Refills: 3 | Status: SHIPPED | OUTPATIENT
Start: 2024-12-30

## 2024-12-30 RX ORDER — IPRATROPIUM BROMIDE AND ALBUTEROL SULFATE 2.5; .5 MG/3ML; MG/3ML
1 SOLUTION RESPIRATORY (INHALATION) ONCE
Status: COMPLETED | OUTPATIENT
Start: 2024-12-30 | End: 2024-12-30

## 2024-12-30 RX ADMIN — IPRATROPIUM BROMIDE AND ALBUTEROL SULFATE 1 DOSE: .5; 3 SOLUTION RESPIRATORY (INHALATION) at 21:37

## 2024-12-30 RX ADMIN — ALBUTEROL SULFATE 5 MG: 2.5 SOLUTION RESPIRATORY (INHALATION) at 21:37

## 2024-12-30 RX ADMIN — PREDNISONE 60 MG: 20 TABLET ORAL at 21:28

## 2024-12-30 ASSESSMENT — PAIN - FUNCTIONAL ASSESSMENT
PAIN_FUNCTIONAL_ASSESSMENT: NONE - DENIES PAIN
PAIN_FUNCTIONAL_ASSESSMENT: NONE - DENIES PAIN

## 2024-12-30 ASSESSMENT — ENCOUNTER SYMPTOMS
WHEEZING: 0
DIARRHEA: 0
ABDOMINAL PAIN: 0
CHEST TIGHTNESS: 0
NAUSEA: 0
COUGH: 0
VOMITING: 0
SHORTNESS OF BREATH: 0

## 2024-12-31 NOTE — ED PROVIDER NOTES
DISPOSITION/PLAN     DISPOSITION Decision To Discharge 12/30/2024 09:20:15 PM   DISPOSITION CONDITION Stable           PATIENT REFERRED TO:  Cortez Tran MD  [facility] [place] [address]  [phone]    Schedule an appointment as soon as possible for a visit         DISCHARGE MEDICATIONS:  New Prescriptions    ALBUTEROL SULFATE HFA (PROAIR HFA) 108 (90 BASE) MCG/ACT INHALER    1-2 puffs every 4 hours while awake for 7-10 days then PRN wheezing  Dispense with SPACER and Instruct on use.  May sub Ventolin or Proventil as needed per Insurance.    METHYLPREDNISOLONE (MEDROL, ALIYA,) 4 MG TABLET    By mouth.       DISCONTINUED MEDICATIONS:  Discontinued Medications    ALBUTEROL SULFATE HFA (VENTOLIN HFA) 108 (90 BASE) MCG/ACT INHALER    Inhale 2 puffs into the lungs 4 times daily as needed for Wheezing              (Please note that portions of this note were completed with a voice recognition program.  Efforts were made to edit the dictations but occasionally words are mis-transcribed.)    Vishnu Billy PA-C (electronically signed)           Vishnu Billy PA-C  12/30/24 [id number]

## 2025-01-16 ENCOUNTER — HOSPITAL ENCOUNTER (OUTPATIENT)
Dept: CT IMAGING | Age: 45
Discharge: HOME | End: 2025-01-16
Attending: INTERNAL MEDICINE
Payer: MEDICAID

## 2025-01-16 ENCOUNTER — HOSPITAL ENCOUNTER (OUTPATIENT)
Dept: PULMONOLOGY | Age: 45
Discharge: HOME | End: 2025-01-16
Attending: INTERNAL MEDICINE
Payer: MEDICAID

## 2025-01-16 DIAGNOSIS — R91.8 LUNG INFILTRATE ON CT: ICD-10-CM

## 2025-01-16 DIAGNOSIS — J45.20 MILD INTERMITTENT REACTIVE AIRWAY DISEASE WITHOUT COMPLICATION: ICD-10-CM

## 2025-01-16 LAB
DLCO %PRED: 74 %
DLCO PRED: NORMAL
DLCO/VA %PRED: NORMAL
DLCO/VA PRED: NORMAL
DLCO/VA: NORMAL
DLCO: NORMAL
EXPIRATORY TIME-POST: NORMAL
EXPIRATORY TIME: NORMAL
FEF 25-75 %CHNG: NORMAL
FEF 25-75 POST %PRED: NORMAL
FEF 25-75% %PRED-PRE: NORMAL
FEF 25-75% PRED: NORMAL
FEF 25-75-POST: NORMAL
FEF 25-75-PRE: NORMAL
FEV1 %PRED-POST: 82 %
FEV1 %PRED-PRE: 75 %
FEV1 PRED: NORMAL
FEV1-POST: NORMAL
FEV1-PRE: NORMAL
FEV1/FVC %PRED-POST: NORMAL
FEV1/FVC %PRED-PRE: NORMAL
FEV1/FVC PRED: NORMAL
FEV1/FVC-POST: 62 %
FEV1/FVC-PRE: 62 %
FVC %PRED-POST: NORMAL
FVC %PRED-PRE: NORMAL
FVC PRED: NORMAL
FVC-POST: NORMAL
FVC-PRE: NORMAL
GAW %PRED: NORMAL
GAW PRED: NORMAL
GAW: NORMAL
IC PRE %PRED: NORMAL
IC PRED: NORMAL
IC: NORMAL
MEP: NORMAL
MIP: NORMAL
MVV %PRED-PRE: NORMAL
MVV PRED: NORMAL
MVV-PRE: NORMAL
PEF %PRED-POST: NORMAL
PEF %PRED-PRE: NORMAL
PEF PRED: NORMAL
PEF%CHNG: NORMAL
PEF-POST: NORMAL
PEF-PRE: NORMAL
RAW %PRED: NORMAL
RAW PRED: NORMAL
RAW: NORMAL
RV PRE %PRED: NORMAL
RV PRED: NORMAL
RV: NORMAL
SVC %PRED: NORMAL
SVC PRED: NORMAL
SVC: NORMAL
TLC PRE %PRED: 92 %
TLC PRED: NORMAL
TLC: NORMAL
VA %PRED: NORMAL
VA PRED: NORMAL
VA: NORMAL
VTG %PRED: NORMAL
VTG PRED: NORMAL
VTG: NORMAL

## 2025-01-16 PROCEDURE — 94060 EVALUATION OF WHEEZING: CPT

## 2025-01-16 PROCEDURE — 94729 DIFFUSING CAPACITY: CPT

## 2025-01-16 PROCEDURE — 94726 PLETHYSMOGRAPHY LUNG VOLUMES: CPT

## 2025-01-16 PROCEDURE — 71250 CT THORAX DX C-: CPT

## 2025-01-16 PROCEDURE — 6370000000 HC RX 637 (ALT 250 FOR IP): Performed by: INTERNAL MEDICINE

## 2025-01-16 PROCEDURE — 94760 N-INVAS EAR/PLS OXIMETRY 1: CPT

## 2025-01-16 RX ORDER — ALBUTEROL SULFATE 90 UG/1
4 INHALANT RESPIRATORY (INHALATION) ONCE
Status: COMPLETED | OUTPATIENT
Start: 2025-01-16 | End: 2025-01-16

## 2025-01-16 RX ADMIN — Medication 4 PUFF: at 10:36

## 2025-01-16 ASSESSMENT — PULMONARY FUNCTION TESTS
FEV1_PERCENT_PREDICTED_POST: 82
FEV1/FVC_POST: 62
FEV1/FVC_PRE: 62
FEV1_PERCENT_PREDICTED_PRE: 75

## 2025-01-17 NOTE — PROCEDURES
Pulmonary Function Testing      Patient name:  Brian Zamora      Unit #:   [id number]   Date of test:  1/16/2025   Date of interpretation:   1/17/2025    Mr. Brian Zamora is a 44 y.o. year-old male. The spirometry data were acceptable and reproducible.     Spirometry:  Flow volume loops were obstructed. The FEV-1/FVC ratio was decreased. The pre-bronchodilator FEV-1 was 2.79 liters (-1.65 Z score), which was mildly reduced. The FVC was 4.52 liters (-0.06 Z score), which was normal. Response to inhaled bronchodilators (albuterol) was nonsignificant.    Lung volumes:  Lung volumes were tested by plethysmography. The total lung capacity was 6.28 liters (-0.6 Z score), which was normal. The residual volume was 1.67 liters (-0.65 Z score), which was normal. The ratio of residual volume to total lung capacity (RV/TLC) was -0.44 Z score, which was normal.     Diffusion capacity was found to be -1.85 Z score which is mildly reduced.      Interpretation:  Mild obstructive ventilatory defect with normal lung volumes and diffusion capacity    Comments:  Z score  Mild -1.645 to -2.5  Moderate -2.5 to -4.0  Severe: < -4.0     Using Z-scores can reduce age and height bias, and the recommended thresholds correlate with mortality risk.    Sanket Jones MD

## 2025-01-30 ENCOUNTER — OFFICE VISIT (OUTPATIENT)
Age: 45
End: 2025-01-30

## 2025-01-30 VITALS
DIASTOLIC BLOOD PRESSURE: 66 MMHG | OXYGEN SATURATION: 98 % | WEIGHT: 184.4 LBS | SYSTOLIC BLOOD PRESSURE: 112 MMHG | HEART RATE: 65 BPM | BODY MASS INDEX: 26.46 KG/M2

## 2025-01-30 DIAGNOSIS — Z12.11 ENCOUNTER FOR COLORECTAL CANCER SCREENING: ICD-10-CM

## 2025-01-30 DIAGNOSIS — J18.9 MULTIFOCAL PNEUMONIA: ICD-10-CM

## 2025-01-30 DIAGNOSIS — E11.9 TYPE 2 DIABETES MELLITUS WITHOUT COMPLICATION, WITHOUT LONG-TERM CURRENT USE OF INSULIN (HCC): Primary | ICD-10-CM

## 2025-01-30 DIAGNOSIS — Z12.12 ENCOUNTER FOR COLORECTAL CANCER SCREENING: ICD-10-CM

## 2025-01-30 DIAGNOSIS — R79.89 ABNORMAL TSH: ICD-10-CM

## 2025-01-30 RX ORDER — INHALER, ASSIST DEVICES
SPACER (EA) MISCELLANEOUS
COMMUNITY
Start: 2024-12-31

## 2025-01-30 ASSESSMENT — PATIENT HEALTH QUESTIONNAIRE - PHQ9
9. THOUGHTS THAT YOU WOULD BE BETTER OFF DEAD, OR OF HURTING YOURSELF: NOT AT ALL
1. LITTLE INTEREST OR PLEASURE IN DOING THINGS: NOT AT ALL
6. FEELING BAD ABOUT YOURSELF - OR THAT YOU ARE A FAILURE OR HAVE LET YOURSELF OR YOUR FAMILY DOWN: NOT AT ALL
SUM OF ALL RESPONSES TO PHQ9 QUESTIONS 1 & 2: 1
SUM OF ALL RESPONSES TO PHQ QUESTIONS 1-9: 7
7. TROUBLE CONCENTRATING ON THINGS, SUCH AS READING THE NEWSPAPER OR WATCHING TELEVISION: MORE THAN HALF THE DAYS
10. IF YOU CHECKED OFF ANY PROBLEMS, HOW DIFFICULT HAVE THESE PROBLEMS MADE IT FOR YOU TO DO YOUR WORK, TAKE CARE OF THINGS AT HOME, OR GET ALONG WITH OTHER PEOPLE: NOT DIFFICULT AT ALL
5. POOR APPETITE OR OVEREATING: NOT AT ALL
3. TROUBLE FALLING OR STAYING ASLEEP: MORE THAN HALF THE DAYS
8. MOVING OR SPEAKING SO SLOWLY THAT OTHER PEOPLE COULD HAVE NOTICED. OR THE OPPOSITE, BEING SO FIGETY OR RESTLESS THAT YOU HAVE BEEN MOVING AROUND A LOT MORE THAN USUAL: NOT AT ALL
2. FEELING DOWN, DEPRESSED OR HOPELESS: SEVERAL DAYS
4. FEELING TIRED OR HAVING LITTLE ENERGY: MORE THAN HALF THE DAYS
SUM OF ALL RESPONSES TO PHQ QUESTIONS 1-9: 7

## 2025-01-30 ASSESSMENT — ENCOUNTER SYMPTOMS
WHEEZING: 0
EYE REDNESS: 0
ABDOMINAL PAIN: 0
BLOOD IN STOOL: 0
EYE PAIN: 0
SINUS PRESSURE: 0
SHORTNESS OF BREATH: 0

## 2025-01-30 NOTE — PROGRESS NOTES
WITH INHALER AS DIRECTED      albuterol sulfate HFA (PROAIR HFA) 108 (90 Base) MCG/ACT inhaler 1-2 puffs every 4 hours while awake for 7-10 days then PRN wheezing  Dispense with SPACER and Instruct on use.  May sub Ventolin or Proventil as needed per Insurance. 18 g 3     No current facility-administered medications for this visit.       No Known Allergies     Review of Systems   Constitutional:  Negative for chills and fever.   HENT:  Negative for congestion and sinus pressure.    Eyes:  Negative for pain and redness.   Respiratory:  Negative for shortness of breath and wheezing.    Gastrointestinal:  Negative for abdominal pain and blood in stool.   Skin:  Negative for rash.   Neurological:  Negative for weakness and numbness.   Psychiatric/Behavioral:  Negative for dysphoric mood and suicidal ideas.        /66   Pulse 65   SpO2 98%    Physical Exam  Vitals reviewed.   Constitutional:       General: He is not in acute distress.  HENT:      Head: Normocephalic and atraumatic.      Mouth/Throat:      Mouth: Mucous membranes are moist.      Pharynx: No oropharyngeal exudate.   Eyes:      Extraocular Movements: Extraocular movements intact.      Pupils: Pupils are equal, round, and reactive to light.   Cardiovascular:      Rate and Rhythm: Normal rate and regular rhythm.   Pulmonary:      Breath sounds: Normal breath sounds. No wheezing, rhonchi or rales.   Abdominal:      General: There is no distension.      Palpations: Abdomen is soft.      Tenderness: There is no abdominal tenderness.   Musculoskeletal:      Cervical back: No rigidity.      Right lower leg: No edema.      Left lower leg: No edema.   Lymphadenopathy:      Cervical: No cervical adenopathy.   Skin:     General: Skin is warm and dry.      Findings: No rash.   Neurological:      General: No focal deficit present.      Mental Status: He is alert and oriented to person, place, and time.   Psychiatric:         Mood and Affect: Mood normal.

## 2025-02-21 ENCOUNTER — HOSPITAL ENCOUNTER (EMERGENCY)
Age: 45
Discharge: HOME | End: 2025-02-22
Payer: MEDICAID

## 2025-02-21 VITALS
RESPIRATION RATE: 18 BRPM | TEMPERATURE: 97.9 F | WEIGHT: 180 LBS | BODY MASS INDEX: 25.77 KG/M2 | OXYGEN SATURATION: 95 % | DIASTOLIC BLOOD PRESSURE: 76 MMHG | HEART RATE: 96 BPM | SYSTOLIC BLOOD PRESSURE: 112 MMHG | HEIGHT: 70 IN

## 2025-02-21 DIAGNOSIS — Z76.0 ENCOUNTER FOR MEDICATION REFILL: ICD-10-CM

## 2025-02-21 DIAGNOSIS — F17.200 SMOKER: ICD-10-CM

## 2025-02-21 DIAGNOSIS — R06.2 WHEEZING: Primary | ICD-10-CM

## 2025-02-21 PROCEDURE — 99283 EMERGENCY DEPT VISIT LOW MDM: CPT

## 2025-02-21 ASSESSMENT — PAIN - FUNCTIONAL ASSESSMENT: PAIN_FUNCTIONAL_ASSESSMENT: NONE - DENIES PAIN

## 2025-02-22 ENCOUNTER — HOSPITAL ENCOUNTER (EMERGENCY)
Age: 45
Discharge: HOME | End: 2025-02-22
Payer: MEDICAID

## 2025-02-22 VITALS
RESPIRATION RATE: 18 BRPM | DIASTOLIC BLOOD PRESSURE: 79 MMHG | WEIGHT: 180 LBS | BODY MASS INDEX: 25.83 KG/M2 | OXYGEN SATURATION: 98 % | HEART RATE: 100 BPM | SYSTOLIC BLOOD PRESSURE: 117 MMHG | TEMPERATURE: 98.5 F

## 2025-02-22 DIAGNOSIS — J45.41 MODERATE PERSISTENT ASTHMA WITH EXACERBATION: Primary | ICD-10-CM

## 2025-02-22 PROCEDURE — 99283 EMERGENCY DEPT VISIT LOW MDM: CPT

## 2025-02-22 PROCEDURE — 6370000000 HC RX 637 (ALT 250 FOR IP): Performed by: PHYSICIAN ASSISTANT

## 2025-02-22 PROCEDURE — 6360000002 HC RX W HCPCS: Performed by: NURSE PRACTITIONER

## 2025-02-22 PROCEDURE — 94640 AIRWAY INHALATION TREATMENT: CPT

## 2025-02-22 PROCEDURE — 6370000000 HC RX 637 (ALT 250 FOR IP): Performed by: NURSE PRACTITIONER

## 2025-02-22 RX ORDER — ALBUTEROL SULFATE 90 UG/1
2 INHALANT RESPIRATORY (INHALATION) EVERY 4 HOURS PRN
Qty: 18 G | Refills: 0 | Status: SHIPPED | OUTPATIENT
Start: 2025-02-22

## 2025-02-22 RX ORDER — IPRATROPIUM BROMIDE AND ALBUTEROL SULFATE 2.5; .5 MG/3ML; MG/3ML
1 SOLUTION RESPIRATORY (INHALATION) ONCE
Status: COMPLETED | OUTPATIENT
Start: 2025-02-22 | End: 2025-02-22

## 2025-02-22 RX ORDER — PREDNISONE 20 MG/1
60 TABLET ORAL ONCE
Status: COMPLETED | OUTPATIENT
Start: 2025-02-22 | End: 2025-02-22

## 2025-02-22 RX ORDER — METHYLPREDNISOLONE 4 MG/1
2 TABLET ORAL DAILY
Qty: 3 TABLET | Refills: 0 | Status: SHIPPED | OUTPATIENT
Start: 2025-02-22 | End: 2025-02-28

## 2025-02-22 RX ORDER — ALBUTEROL SULFATE 0.83 MG/ML
5 SOLUTION RESPIRATORY (INHALATION) ONCE
Status: COMPLETED | OUTPATIENT
Start: 2025-02-22 | End: 2025-02-22

## 2025-02-22 RX ADMIN — IPRATROPIUM BROMIDE AND ALBUTEROL SULFATE 1 DOSE: .5; 3 SOLUTION RESPIRATORY (INHALATION) at 14:27

## 2025-02-22 RX ADMIN — ALBUTEROL SULFATE 5 MG: 2.5 SOLUTION RESPIRATORY (INHALATION) at 00:56

## 2025-02-22 RX ADMIN — PREDNISONE 60 MG: 20 TABLET ORAL at 14:09

## 2025-02-22 RX ADMIN — IPRATROPIUM BROMIDE AND ALBUTEROL SULFATE 1 DOSE: .5; 3 SOLUTION RESPIRATORY (INHALATION) at 00:56

## 2025-02-22 ASSESSMENT — ENCOUNTER SYMPTOMS
WHEEZING: 1
COUGH: 1
SHORTNESS OF BREATH: 1
VOMITING: 0
DIARRHEA: 0
VOMITING: 0
COUGH: 1
ABDOMINAL PAIN: 0
WHEEZING: 1
NAUSEA: 0
SHORTNESS OF BREATH: 0
NAUSEA: 0
STRIDOR: 0
CHEST TIGHTNESS: 0
ABDOMINAL PAIN: 0
COLOR CHANGE: 0
BACK PAIN: 0

## 2025-02-22 ASSESSMENT — PAIN - FUNCTIONAL ASSESSMENT: PAIN_FUNCTIONAL_ASSESSMENT: NONE - DENIES PAIN

## 2025-02-22 NOTE — ED PROVIDER NOTES
[facility] EMERGENCY DEPARTMENT  EMERGENCY DEPARTMENT ENCOUNTER        Pt Name: Brian Zamora  MRN: [id number]  Birthdate 1980  Date of evaluation: 2/21/2025  Provider: JASE De La O - CNP  PCP: Cortez Tran MD  Note Started: 12:16 AM EST 2/22/25      ARJUN. I have evaluated this patient.        CHIEF COMPLAINT       Chief Complaint   Patient presents with    Medication Refill     Pt needs Albuterol refill. Pt stated cough       HISTORY OF PRESENT ILLNESS: 1 or more Elements     History From: patient  Limitations to history : None    Brian Zamora is a 44 y.o. male who presents to the ER with concern for albuterol inhaler being empty and needs refill.  Hx of asthma and ILD and sees pulmonology, recent pneumonia that was treated with abx and clear on follow up imaging.  Reports that he is coughing, wheezing not out of character.    He does still smoke.    Denies any headache, fever, lightheadedness, dizziness, visual disturbances.  No chest pain or pressure.  No neck or back pain.  No abdominal pain, nausea, vomiting, diarrhea, constipation, or dysuria.  No rash.    Nursing Notes were all reviewed and agreed with or any disagreements were addressed in the HPI.    REVIEW OF SYSTEMS :      Review of Systems   Constitutional:  Negative for activity change, chills and fever.   Respiratory:  Positive for cough and wheezing. Negative for chest tightness and shortness of breath.    Cardiovascular:  Negative for chest pain.   Gastrointestinal:  Negative for abdominal pain, diarrhea, nausea and vomiting.   Genitourinary:  Negative for dysuria.   All other systems reviewed and are negative.      Positives and Pertinent negatives as per HPI.     SURGICAL HISTORY   History reviewed. No pertinent surgical history.    CURRENTMEDICATIONS       Discharge Medication List as of 2/22/2025  1:02 AM        CONTINUE these medications which have NOT CHANGED    Details   Spacer/Aero-Holding Chambers

## 2025-02-22 NOTE — ED PROVIDER NOTES
[facility] EMERGENCY DEPARTMENT  EMERGENCY DEPARTMENT ENCOUNTER        Pt Name: Brian Zamora  MRN: [id number]  Birthdate 1980  Date of evaluation: 2/22/2025  Provider: Radha Hdez PA-C  PCP: Cortez Tran MD  Note Started: 2:06 PM EST 2/22/25      ARJUN. I have evaluated this patient.        CHIEF COMPLAINT       Chief Complaint   Patient presents with    Cough     States productive cough x 1 week. Also states medication refill        HISTORY OF PRESENT ILLNESS: 1 or more Elements     History From: patient  Limitations to history : Language Nicaraguan but does speak ENglish    Brian Zamora is a 44 y.o. male who presents to the emergency department complaining of cough, shortness of breath, wheezing.  He believes this to be related to his asthma.  He came to the emergency department yesterday for albuterol inhaler refill.  However, he believes that he needs steroids as well.  He denies fever or chills, chest pain, palpitations, hemoptysis, pain or swelling in extremities.  He does have a pulmonologist but claims that he does not have PCP that he sees regularly.    Nursing Notes were all reviewed and agreed with or any disagreements were addressed in the HPI.    REVIEW OF SYSTEMS :      Review of Systems   Constitutional:  Negative for chills and fever.   HENT: Negative.     Eyes:  Negative for visual disturbance.   Respiratory:  Positive for cough, shortness of breath and wheezing. Negative for stridor.    Cardiovascular:  Negative for chest pain, palpitations and leg swelling.   Gastrointestinal:  Negative for abdominal pain, nausea and vomiting.   Genitourinary: Negative.    Musculoskeletal:  Negative for back pain, neck pain and neck stiffness.   Skin:  Negative for color change, pallor, rash and wound.   Neurological: Negative.    Psychiatric/Behavioral:  Negative for confusion.    All other systems reviewed and are negative.      Positives and Pertinent negatives as per HPI.

## 2025-03-01 PROCEDURE — 99284 EMERGENCY DEPT VISIT MOD MDM: CPT

## 2025-03-02 ENCOUNTER — HOSPITAL ENCOUNTER (EMERGENCY)
Age: 45
Discharge: HOME | End: 2025-03-02
Attending: EMERGENCY MEDICINE
Payer: MEDICAID

## 2025-03-02 ENCOUNTER — APPOINTMENT (OUTPATIENT)
Dept: GENERAL RADIOLOGY | Age: 45
End: 2025-03-02
Payer: MEDICAID

## 2025-03-02 VITALS
DIASTOLIC BLOOD PRESSURE: 90 MMHG | SYSTOLIC BLOOD PRESSURE: 121 MMHG | RESPIRATION RATE: 16 BRPM | TEMPERATURE: 98.8 F | OXYGEN SATURATION: 98 % | HEART RATE: 65 BPM

## 2025-03-02 DIAGNOSIS — J40 BRONCHITIS: Primary | ICD-10-CM

## 2025-03-02 PROCEDURE — 6370000000 HC RX 637 (ALT 250 FOR IP): Performed by: EMERGENCY MEDICINE

## 2025-03-02 PROCEDURE — 87636 SARSCOV2 & INF A&B AMP PRB: CPT

## 2025-03-02 PROCEDURE — 94761 N-INVAS EAR/PLS OXIMETRY MLT: CPT

## 2025-03-02 PROCEDURE — 71045 X-RAY EXAM CHEST 1 VIEW: CPT

## 2025-03-02 PROCEDURE — 94640 AIRWAY INHALATION TREATMENT: CPT

## 2025-03-02 RX ORDER — IPRATROPIUM BROMIDE AND ALBUTEROL SULFATE 2.5; .5 MG/3ML; MG/3ML
1 SOLUTION RESPIRATORY (INHALATION) ONCE
Status: COMPLETED | OUTPATIENT
Start: 2025-03-02 | End: 2025-03-02

## 2025-03-02 RX ORDER — BENZONATATE 100 MG/1
100 CAPSULE ORAL 2 TIMES DAILY PRN
Qty: 14 CAPSULE | Refills: 0 | Status: SHIPPED | OUTPATIENT
Start: 2025-03-02 | End: 2025-03-09

## 2025-03-02 RX ORDER — PREDNISONE 20 MG/1
40 TABLET ORAL DAILY
Qty: 10 TABLET | Refills: 0 | Status: SHIPPED | OUTPATIENT
Start: 2025-03-02 | End: 2025-03-07

## 2025-03-02 RX ORDER — DOXYCYCLINE HYCLATE 100 MG
100 TABLET ORAL 2 TIMES DAILY
Qty: 14 TABLET | Refills: 0 | Status: SHIPPED | OUTPATIENT
Start: 2025-03-02 | End: 2025-03-09

## 2025-03-02 RX ORDER — BENZONATATE 100 MG/1
100 CAPSULE ORAL 3 TIMES DAILY PRN
Status: DISCONTINUED | OUTPATIENT
Start: 2025-03-02 | End: 2025-03-02 | Stop reason: HOSPADM

## 2025-03-02 RX ADMIN — IPRATROPIUM BROMIDE AND ALBUTEROL SULFATE 1 DOSE: .5; 3 SOLUTION RESPIRATORY (INHALATION) at 03:03

## 2025-03-02 RX ADMIN — BENZONATATE 100 MG: 100 CAPSULE ORAL at 04:11

## 2025-03-02 NOTE — ED PROVIDER NOTES
EMERGENCY MEDICINE ATTENDING NOTE  Leo Dominguez Jr., DO, FACEP, FAAEM        CHIEF COMPLAINT  Chief Complaint   Patient presents with    Cough        HISTORY OF PRESENT ILLNESS  Brian Zamora is a 44 y.o. male who presents to the ED for evaluation of cough and wheezing.  Patient has known asthma and states recently it has been causing him issues.  Is continuing to cough which is nonproductive.  Not having fevers or chills or other complaints.  States he has been treated a few times in the last few weeks for this does not seem to be getting better.  Just finished a course of steroids with no improvement.    Nursing/triage notes reviewed.  No other complaints, modifying factors or associated symptoms.     REVIEW OF SYSTEMS:  All systems are reviewed and are negative unless noted in the HPI.    PAST MEDICAL HISTORY  History reviewed. No pertinent past medical history.    SURGICAL HISTORY  History reviewed. No pertinent surgical history.    FAMILY HISTORY  Family History   Problem Relation Age of Onset    No Known Problems Mother     No Known Problems Father        SOCIAL HISTORY  Social History     Socioeconomic History    Marital status: Single     Spouse name: Not on file    Number of children: Not on file    Years of education: Not on file    Highest education level: Not on file   Occupational History    Not on file   Tobacco Use    Smoking status: Every Day     Current packs/day: 0.00     Types: Cigarettes     Last attempt to quit: 2024     Years since quittin.5     Passive exposure: Past    Smokeless tobacco: Never   Vaping Use    Vaping status: Never Used   Substance and Sexual Activity    Alcohol use: Not Currently    Drug use: Never    Sexual activity: Not Currently   Other Topics Concern    Not on file   Social History Narrative    Not on file     Social Determinants of Health     Financial Resource Strain: High Risk (2024)    Overall Financial Resource Strain (CARDIA)     Difficulty

## 2025-03-02 NOTE — ED TRIAGE NOTES
Patient arrives via triage for a cough. Patient recently completed a steroid dose pack 1 week ago. Patient has been experiencing the cough for  6-7 months              used for triage

## 2025-04-26 ENCOUNTER — HOSPITAL ENCOUNTER (EMERGENCY)
Age: 45
Discharge: HOME | End: 2025-04-26
Payer: MEDICAID

## 2025-04-26 VITALS
SYSTOLIC BLOOD PRESSURE: 116 MMHG | WEIGHT: 187.7 LBS | DIASTOLIC BLOOD PRESSURE: 87 MMHG | TEMPERATURE: 98.2 F | HEART RATE: 68 BPM | BODY MASS INDEX: 26.93 KG/M2 | RESPIRATION RATE: 14 BRPM | OXYGEN SATURATION: 100 %

## 2025-04-26 DIAGNOSIS — R06.2 WHEEZING: Primary | ICD-10-CM

## 2025-04-26 PROCEDURE — 6370000000 HC RX 637 (ALT 250 FOR IP): Performed by: NURSE PRACTITIONER

## 2025-04-26 PROCEDURE — 6360000002 HC RX W HCPCS: Performed by: NURSE PRACTITIONER

## 2025-04-26 PROCEDURE — 99283 EMERGENCY DEPT VISIT LOW MDM: CPT

## 2025-04-26 PROCEDURE — 94640 AIRWAY INHALATION TREATMENT: CPT

## 2025-04-26 RX ORDER — LIDOCAINE 4 G/G
1 PATCH TOPICAL ONCE
Status: DISCONTINUED | OUTPATIENT
Start: 2025-04-26 | End: 2025-04-27 | Stop reason: HOSPADM

## 2025-04-26 RX ORDER — IPRATROPIUM BROMIDE AND ALBUTEROL SULFATE 2.5; .5 MG/3ML; MG/3ML
1 SOLUTION RESPIRATORY (INHALATION) ONCE
Status: COMPLETED | OUTPATIENT
Start: 2025-04-26 | End: 2025-04-26

## 2025-04-26 RX ORDER — ALBUTEROL SULFATE 0.83 MG/ML
5 SOLUTION RESPIRATORY (INHALATION) ONCE
Status: COMPLETED | OUTPATIENT
Start: 2025-04-26 | End: 2025-04-26

## 2025-04-26 RX ORDER — BUDESONIDE AND FORMOTEROL FUMARATE DIHYDRATE 160; 4.5 UG/1; UG/1
2 AEROSOL RESPIRATORY (INHALATION) 2 TIMES DAILY
Qty: 10.2 G | Refills: 3 | Status: SHIPPED | OUTPATIENT
Start: 2025-04-26

## 2025-04-26 RX ORDER — ACETAMINOPHEN 500 MG
1000 TABLET ORAL EVERY 6 HOURS PRN
Qty: 30 TABLET | Refills: 0 | Status: SHIPPED | OUTPATIENT
Start: 2025-04-26

## 2025-04-26 RX ORDER — LIDOCAINE 4 G/G
1 PATCH TOPICAL DAILY
Qty: 30 PATCH | Refills: 0 | Status: SHIPPED | OUTPATIENT
Start: 2025-04-26 | End: 2025-05-26

## 2025-04-26 RX ORDER — ACETAMINOPHEN 500 MG
1000 TABLET ORAL ONCE
Status: COMPLETED | OUTPATIENT
Start: 2025-04-26 | End: 2025-04-26

## 2025-04-26 RX ORDER — ALBUTEROL SULFATE 90 UG/1
2 INHALANT RESPIRATORY (INHALATION) EVERY 4 HOURS PRN
Qty: 18 G | Refills: 0 | Status: SHIPPED | OUTPATIENT
Start: 2025-04-26

## 2025-04-26 RX ADMIN — IPRATROPIUM BROMIDE AND ALBUTEROL SULFATE 1 DOSE: .5; 3 SOLUTION RESPIRATORY (INHALATION) at 21:36

## 2025-04-26 RX ADMIN — ALBUTEROL SULFATE 5 MG: 2.5 SOLUTION RESPIRATORY (INHALATION) at 21:36

## 2025-04-26 RX ADMIN — ACETAMINOPHEN 1000 MG: 500 TABLET ORAL at 20:42

## 2025-04-26 ASSESSMENT — ENCOUNTER SYMPTOMS
CHEST TIGHTNESS: 1
NAUSEA: 0
SHORTNESS OF BREATH: 1
COUGH: 1
BACK PAIN: 1
WHEEZING: 1
VOMITING: 0
DIARRHEA: 0
ABDOMINAL PAIN: 0

## 2025-04-26 ASSESSMENT — PAIN - FUNCTIONAL ASSESSMENT: PAIN_FUNCTIONAL_ASSESSMENT: 0-10

## 2025-04-26 ASSESSMENT — PAIN SCALES - GENERAL
PAINLEVEL_OUTOF10: 4
PAINLEVEL_OUTOF10: 3

## 2025-04-27 NOTE — ED PROVIDER NOTES
Wheezing          DISPOSITION/PLAN     DISPOSITION Discharge - Pending Orders Complete 04/26/2025 08:22:08 PM   DISPOSITION CONDITION Stable           PATIENT REFERRED TO:  Sanket Jones MD  [address] [facility], [address]  [place] [address]  [phone]    Schedule an appointment as soon as possible for a visit       Cortez Tran MD  [address] [facility]  [address]  [place] [address]  [phone]    Schedule an appointment as soon as possible for a visit         DISCHARGE MEDICATIONS:  New Prescriptions    ACETAMINOPHEN (TYLENOL) 500 MG TABLET    Take 2 tablets by mouth every 6 hours as needed for Pain    ALBUTEROL SULFATE HFA (PROVENTIL HFA) 108 (90 BASE) MCG/ACT INHALER    Inhale 2 puffs into the lungs every 4 hours as needed for Wheezing or Shortness of Breath (Space out to every 6 hours as symptoms improve) Space out to every 6 hours as symptoms improve.    BUDESONIDE-FORMOTEROL (SYMBICORT) 160-4.5 MCG/ACT AERO    Inhale 2 puffs into the lungs 2 times daily    LIDOCAINE 4 % EXTERNAL PATCH    Place 1 patch onto the skin daily       DISCONTINUED MEDICATIONS:  Discontinued Medications    BECLOMETHASONE (QVAR REDIHALER) 80 MCG/ACT AERB INHALER    Inhale 1 puff into the lungs in the morning and 1 puff in the evening.              (Please note that portions of this note were completed with a voice recognition program.  Efforts were made to edit the dictations but occasionally words are mis-transcribed.)    JASE De La O CNP (electronically signed)       Cecily Dorsey APRN - CNP  04/26/25 [id number]

## 2025-05-05 ENCOUNTER — OFFICE VISIT (OUTPATIENT)
Age: 45
End: 2025-05-05
Payer: MEDICAID

## 2025-05-05 VITALS
WEIGHT: 186 LBS | BODY MASS INDEX: 26.69 KG/M2 | SYSTOLIC BLOOD PRESSURE: 116 MMHG | OXYGEN SATURATION: 99 % | HEART RATE: 77 BPM | DIASTOLIC BLOOD PRESSURE: 76 MMHG

## 2025-05-05 DIAGNOSIS — Z72.0 TOBACCO ABUSE: ICD-10-CM

## 2025-05-05 DIAGNOSIS — E11.9 TYPE 2 DIABETES MELLITUS WITHOUT COMPLICATION, WITHOUT LONG-TERM CURRENT USE OF INSULIN (HCC): Primary | ICD-10-CM

## 2025-05-05 DIAGNOSIS — J45.40 MODERATE PERSISTENT REACTIVE AIRWAY DISEASE WITHOUT COMPLICATION: ICD-10-CM

## 2025-05-05 DIAGNOSIS — R79.89 ABNORMAL TSH: ICD-10-CM

## 2025-05-05 PROCEDURE — 99213 OFFICE O/P EST LOW 20 MIN: CPT | Performed by: INTERNAL MEDICINE

## 2025-05-05 RX ORDER — ALBUTEROL SULFATE 90 UG/1
2 INHALANT RESPIRATORY (INHALATION) EVERY 4 HOURS PRN
Qty: 18 G | Refills: 5 | Status: SHIPPED | OUTPATIENT
Start: 2025-05-05

## 2025-05-05 ASSESSMENT — ENCOUNTER SYMPTOMS
EYE PAIN: 0
SINUS PRESSURE: 0
COUGH: 1
WHEEZING: 1
ABDOMINAL PAIN: 0
SHORTNESS OF BREATH: 0
BLOOD IN STOOL: 0
EYE REDNESS: 0

## 2025-05-05 NOTE — PROGRESS NOTES
Mouth: Mucous membranes are moist.      Pharynx: No oropharyngeal exudate.   Eyes:      Extraocular Movements: Extraocular movements intact.      Pupils: Pupils are equal, round, and reactive to light.   Cardiovascular:      Rate and Rhythm: Normal rate and regular rhythm.   Pulmonary:      Breath sounds: Wheezing present. No rhonchi or rales.      Comments: Modest expiratory wheezing  Abdominal:      General: There is no distension.      Palpations: Abdomen is soft.      Tenderness: There is no abdominal tenderness.   Musculoskeletal:      Cervical back: No rigidity.      Right lower leg: No edema.      Left lower leg: No edema.   Lymphadenopathy:      Cervical: No cervical adenopathy.   Skin:     General: Skin is warm and dry.      Findings: No rash.   Neurological:      General: No focal deficit present.      Mental Status: He is alert and oriented to person, place, and time.   Psychiatric:         Mood and Affect: Mood normal.         Behavior: Behavior normal.           An electronic signature was used to authenticate this note.    Electronically signed by Cortez Tran MD on 5/5/2025 at 10:19 AM.

## 2025-05-05 NOTE — ASSESSMENT & PLAN NOTE
He had a recent (1/2025) CT demonstrating resolution of multifactorial pneumonia, however he continue to have symptoms. He is on symbicort and albuterol. He is still smoking, was counseled on cessation. He was also counseled to return to the pulmonology office.

## 2025-06-26 ENCOUNTER — TELEPHONE (OUTPATIENT)
Age: 45
End: 2025-06-26

## 2025-06-26 NOTE — TELEPHONE ENCOUNTER
Pt came back to the office asking for Symbicort, pharmacy filled prescription for Albuterol. Verified with pharmacy refills/medication are available and they will process request. I advised pt pharmacy is processing refill and he will need to need to verify with them how long it will take. Pt expressed understanding    Session: [id number]

## 2025-06-26 NOTE — TELEPHONE ENCOUNTER
Pt came into office to refill for Symbicort. I advised refills should be available through pharmacy. Pt also requested medication for cold and HA. Schld OV    [id number]

## 2025-07-01 ENCOUNTER — OFFICE VISIT (OUTPATIENT)
Age: 45
End: 2025-07-01
Payer: MEDICAID

## 2025-07-01 VITALS
BODY MASS INDEX: 25.63 KG/M2 | SYSTOLIC BLOOD PRESSURE: 110 MMHG | OXYGEN SATURATION: 98 % | DIASTOLIC BLOOD PRESSURE: 68 MMHG | WEIGHT: 178.6 LBS | HEART RATE: 70 BPM

## 2025-07-01 DIAGNOSIS — R06.2 WHEEZING: ICD-10-CM

## 2025-07-01 DIAGNOSIS — R05.3 CHRONIC COUGH: ICD-10-CM

## 2025-07-01 DIAGNOSIS — J45.41 MODERATE PERSISTENT REACTIVE AIRWAY DISEASE WITH ACUTE EXACERBATION: Primary | ICD-10-CM

## 2025-07-01 DIAGNOSIS — Z72.0 TOBACCO ABUSE: ICD-10-CM

## 2025-07-01 PROBLEM — J18.9 MULTIFOCAL PNEUMONIA: Status: RESOLVED | Noted: 2024-10-12 | Resolved: 2025-07-01

## 2025-07-01 PROCEDURE — 99214 OFFICE O/P EST MOD 30 MIN: CPT | Performed by: INTERNAL MEDICINE

## 2025-07-01 RX ORDER — VARENICLINE TARTRATE 1 MG/1
1 TABLET, FILM COATED ORAL 2 TIMES DAILY
Qty: 60 TABLET | Refills: 0 | Status: SHIPPED | OUTPATIENT
Start: 2025-07-01

## 2025-07-01 RX ORDER — BUDESONIDE AND FORMOTEROL FUMARATE DIHYDRATE 160; 4.5 UG/1; UG/1
2 AEROSOL RESPIRATORY (INHALATION) 2 TIMES DAILY
Qty: 10.2 G | Refills: 3 | Status: SHIPPED | OUTPATIENT
Start: 2025-07-01

## 2025-07-01 RX ORDER — VARENICLINE TARTRATE 0.5 MG/1
.5-1 TABLET, FILM COATED ORAL SEE ADMIN INSTRUCTIONS
Qty: 57 TABLET | Refills: 0 | Status: SHIPPED | OUTPATIENT
Start: 2025-07-01

## 2025-07-01 RX ORDER — AZITHROMYCIN 500 MG/1
500 TABLET, FILM COATED ORAL DAILY
Qty: 3 TABLET | Refills: 0 | Status: SHIPPED | OUTPATIENT
Start: 2025-07-01 | End: 2025-07-04

## 2025-07-01 RX ORDER — ALBUTEROL SULFATE 90 UG/1
2 INHALANT RESPIRATORY (INHALATION) EVERY 4 HOURS PRN
Qty: 18 G | Refills: 2 | Status: SHIPPED | OUTPATIENT
Start: 2025-07-01

## 2025-07-01 RX ORDER — VARENICLINE TARTRATE 0.5 MG/1
0.5 TABLET, FILM COATED ORAL DAILY
Qty: 11 TABLET | Refills: 0 | Status: SHIPPED | OUTPATIENT
Start: 2025-07-01 | End: 2025-07-12

## 2025-07-01 RX ORDER — PREDNISONE 50 MG/1
50 TABLET ORAL DAILY
Qty: 5 TABLET | Refills: 0 | Status: SHIPPED | OUTPATIENT
Start: 2025-07-01 | End: 2025-07-06

## 2025-07-01 ASSESSMENT — ENCOUNTER SYMPTOMS
SHORTNESS OF BREATH: 1
CONSTIPATION: 0
COUGH: 1
SORE THROAT: 0
ABDOMINAL PAIN: 0

## 2025-07-01 NOTE — TELEPHONE ENCOUNTER
[facility] states that insurance will not cover 2 tablets a day for the medication prescribed today. Would like to know if it would be acceptable to adjust that to one script of .5MG and one for 1MG

## 2025-07-01 NOTE — TELEPHONE ENCOUNTER
[facility] pharmacy requesting Chantix scripts be two separate orders. 0.5mg tablets for 11 days and then 1mg tablets for 30 days. Orders pended, please advise.

## 2025-07-01 NOTE — PROGRESS NOTES
Brian Zamora (:  1980) is a 45 y.o. male here for evaluation of the following chief complaint(s):  Congestion (Nasal drainage, yellow/white x 2 wk/Session: 72988/: 181202) and Cough (Productive cough, yellow/white sputum x 1 mo)      Subjective   45-year-old male from [place] with controlled type 2 diabetes, history of depression, chronic smoker, reactive airway disease presents to the clinic for an acute visit.     His predominant complaint is cough with chest congestion which has been ongoing for the last 2 months.  He reports cough is associated with productive yellowish-white sputum.  He denies nasal congestion, sinus pressure, sore throat,  earache.  He denies fevers, chills, shortness of breath, palpitations, pedal edema.  He reports that he gets chest pain as he has been having chronic cough.  He reports that he has been using Symbicort twice daily.  He is saturating 98% on room air.  He denies any sick contacts.  He denies any recent international travel.  Does not have any pet dogs.  He reports that his appetite is not the best.         Review of Systems   Constitutional:  Negative for fatigue and fever.   HENT:  Negative for postnasal drip and sore throat.    Respiratory:  Positive for cough and shortness of breath.    Cardiovascular:  Positive for chest pain. Negative for leg swelling.   Gastrointestinal:  Negative for abdominal pain and constipation.   Genitourinary:  Negative for dysuria and hematuria.   Musculoskeletal:  Negative for arthralgias and myalgias.   Skin:  Negative for rash.   Neurological:  Negative for weakness and headaches.   Psychiatric/Behavioral:  Negative for hallucinations and suicidal ideas.           Objective   Physical Exam  Vitals reviewed.   Constitutional:       Appearance: Normal appearance.   HENT:      Head: Normocephalic and atraumatic.      Nose: Nose normal.      Mouth/Throat:      Mouth: Mucous membranes are moist.      Pharynx: Oropharynx

## 2025-07-01 NOTE — CONSULTS
Session ID: [id number]  Session Duration: Longer than 53 minutes  Language: Greenlandic   ID: #[id number]   Name: Jaimie

## 2025-07-12 ENCOUNTER — HOSPITAL ENCOUNTER (EMERGENCY)
Age: 45
Discharge: HOME | End: 2025-07-12
Payer: MEDICAID

## 2025-07-12 VITALS
RESPIRATION RATE: 18 BRPM | DIASTOLIC BLOOD PRESSURE: 87 MMHG | SYSTOLIC BLOOD PRESSURE: 151 MMHG | OXYGEN SATURATION: 100 % | BODY MASS INDEX: 25.83 KG/M2 | WEIGHT: 180 LBS | HEART RATE: 86 BPM | TEMPERATURE: 98.2 F

## 2025-07-12 DIAGNOSIS — S05.01XA ABRASION OF RIGHT CORNEA, INITIAL ENCOUNTER: Primary | ICD-10-CM

## 2025-07-12 PROCEDURE — 99283 EMERGENCY DEPT VISIT LOW MDM: CPT

## 2025-07-12 PROCEDURE — 6370000000 HC RX 637 (ALT 250 FOR IP)

## 2025-07-12 PROCEDURE — 2500000003 HC RX 250 WO HCPCS

## 2025-07-12 RX ORDER — PROPARACAINE HYDROCHLORIDE 5 MG/ML
SOLUTION/ DROPS OPHTHALMIC
Status: COMPLETED
Start: 2025-07-12 | End: 2025-07-12

## 2025-07-12 RX ORDER — BALANCED SALT SOLUTION ENRICHED WITH BICARBONATE, DEXTROSE, AND GLUTATHIONE
KIT INTRAOCULAR
Status: COMPLETED
Start: 2025-07-12 | End: 2025-07-12

## 2025-07-12 RX ORDER — OFLOXACIN 3 MG/ML
3 SOLUTION/ DROPS OPHTHALMIC 4 TIMES DAILY
Qty: 5 ML | Refills: 0 | Status: SHIPPED | OUTPATIENT
Start: 2025-07-12 | End: 2025-07-22

## 2025-07-12 RX ORDER — SULFACETAMIDE SODIUM 100 MG/ML
SOLUTION/ DROPS OPHTHALMIC
Status: COMPLETED
Start: 2025-07-12 | End: 2025-07-12

## 2025-07-12 RX ORDER — IBUPROFEN 600 MG/1
600 TABLET, FILM COATED ORAL 3 TIMES DAILY PRN
Qty: 30 TABLET | Refills: 0 | Status: SHIPPED | OUTPATIENT
Start: 2025-07-12

## 2025-07-12 RX ORDER — FLUORESCEIN SODIUM 1 MG/MG
STRIP OPHTHALMIC
Status: COMPLETED
Start: 2025-07-12 | End: 2025-07-12

## 2025-07-12 RX ADMIN — BALANCED SALT SOLUTION: 6.4; .75; .48; .3; 3.9; 1.7 SOLUTION OPHTHALMIC at 09:18

## 2025-07-12 RX ADMIN — SULFACETAMIDE SODIUM: 100 SOLUTION/ DROPS OPHTHALMIC at 09:19

## 2025-07-12 RX ADMIN — FLUORESCEIN SODIUM: 1 STRIP OPHTHALMIC at 09:19

## 2025-07-12 RX ADMIN — PROPARACAINE HYDROCHLORIDE: 5 SOLUTION/ DROPS OPHTHALMIC at 09:19

## 2025-07-12 ASSESSMENT — VISUAL ACUITY
OU: 20/20
OS: 20/20
OU: 1
OD: 20/20

## 2025-07-12 ASSESSMENT — TONOMETRY
IOP_HANDHELD: 1
OD_IOP_MMHG: 11
OS_IOP_MMHG: 13

## 2025-07-12 ASSESSMENT — ENCOUNTER SYMPTOMS: EYE PAIN: 1

## 2025-07-12 NOTE — CONSULTS
Session ID: [id number]  Session Duration: Longer than 53 minutes  Language: Yoruba   ID: #[id number]   Name: Megan

## 2025-07-12 NOTE — DISCHARGE INSTRUCTIONS
Follow up with your primary care provider in one week for a recheck    Take medications as prescribed.    Return to the ED if you have any worsening symptoms.     Follow up with [facility] on Monday for a recheck.

## 2025-07-12 NOTE — ED PROVIDER NOTES
severe sepsis or septic shock?   No   Exclusion criteria - the patient is NOT to be included for SEP-1 Core Measure due to:  Infection is not suspected    Patient was given the following medications:  Medications   sulfacetamide (BLEPH-10) 10 % ophthalmic solution (has no administration in time range)   proparacaine (ALCAINE) 0.5 % ophthalmic solution (has no administration in time range)   balanced salts plus (BSS) ophthalmic solution (  Given 7/12/25 0918)   fluorescein 1 MG ophthalmic strip (  Given 7/12/25 0919)             Chronic Conditions affecting care:    has a past medical history of Multifocal pneumonia (10/12/2024).    CONSULTS: (Who and What was discussed)  None      Records Reviewed (External, Source and Summary)     CC/HPI Summary, DDx, ED Course, and Reassessment: Briefly is a 45-year-old male who presents emerged part due to right eye pain.  Patient says that he was at work at the time when a piece of metal flew up and hit him in the right eye.    On exam he appears well in no acute distress.  He has normal extraocular muscle movements.  Pupils equal round and reactive to light bilaterally.  On white light exam I do not see any obvious foreign bodies.  Fluorescein stain was done and does show corneal abrasion at the 3 o'clock position of the right eye.  Patient's intraocular pressures were measured and appropriate.  Visual acuity 20/20 vision right eye, 20/20 vision left eye, 20/20 vision bilaterally.    Patient did get relief with tetracaine eyedrops while here in the emergency department.  Patient to be discharged with antibiotic eyedrops, eye patch and referral to [facility] for follow-up on Monday.  Patient was also discharged with anti-inflammatory medications and encouraged to take Tylenol if needed for pain.  Patient will be discharged at this time to follow-up with [facility] for recheck.    At this time low suspicion for retinal detachment, vitreous hemorrhage,  vitreous detachment, CRVO, CRAO, herpes ophthalmicus, keratitis uveitis or other acute emergent etiologies at this time.      The patient tolerated their visit well.  The patient and / or the family were informed of the results of any tests, a time was given to answer questions, a plan was proposed and they agreed with plan.    I am the Primary Clinician of Record.  FINAL IMPRESSION      1. Abrasion of right cornea, initial encounter          DISPOSITION/PLAN     DISPOSITION Decision To Discharge 07/12/2025 09:16:36 AM   DISPOSITION CONDITION Stable           PATIENT REFERRED TO:  [facility]  [address]  [facility] [address]  [phone]    Schedule an appointment as soon as possible for a visit in 1 week      [facility]  [address] [address]  [phone]    As needed, If symptoms worsen    Cortez Tran MD  [address] [id number]  [phone]    Schedule an appointment as soon as possible for a visit in 1 week  recheck      DISCHARGE MEDICATIONS:  New Prescriptions    IBUPROFEN (ADVIL;MOTRIN) 600 MG TABLET    Take 1 tablet by mouth 3 times daily as needed for Pain    OFLOXACIN (OCUFLOX) 0.3 % SOLUTION    Place 3 drops into the right eye 4 times daily for 10 days       DISCONTINUED MEDICATIONS:  Discontinued Medications    No medications on file              (Please note that portions of this note were completed with a voice recognition program.  Efforts were made to edit the dictations but occasionally words are mis-transcribed.)    JESSIAC Gómez (electronically signed)        Tito Johnson PA  07/12/25 0927